# Patient Record
Sex: MALE | Employment: FULL TIME | URBAN - METROPOLITAN AREA
[De-identification: names, ages, dates, MRNs, and addresses within clinical notes are randomized per-mention and may not be internally consistent; named-entity substitution may affect disease eponyms.]

---

## 2020-11-30 ENCOUNTER — APPOINTMENT (OUTPATIENT)
Dept: GENERAL RADIOLOGY | Age: 30
End: 2020-11-30

## 2020-11-30 ENCOUNTER — HOSPITAL ENCOUNTER (EMERGENCY)
Age: 30
Discharge: HOME | End: 2020-11-30
Attending: EMERGENCY MEDICINE

## 2020-11-30 VITALS
WEIGHT: 224 LBS | HEIGHT: 74 IN | TEMPERATURE: 97.1 F | HEART RATE: 67 BPM | DIASTOLIC BLOOD PRESSURE: 93 MMHG | RESPIRATION RATE: 18 BRPM | BODY MASS INDEX: 28.75 KG/M2 | SYSTOLIC BLOOD PRESSURE: 150 MMHG | OXYGEN SATURATION: 99 %

## 2020-11-30 PROCEDURE — 73030 X-RAY EXAM OF SHOULDER: CPT

## 2020-11-30 PROCEDURE — 99283 EMERGENCY DEPT VISIT LOW MDM: CPT

## 2020-11-30 ASSESSMENT — PAIN DESCRIPTION - ONSET: ONSET: ON-GOING

## 2020-11-30 ASSESSMENT — PAIN DESCRIPTION - ORIENTATION: ORIENTATION: LEFT

## 2020-11-30 ASSESSMENT — PAIN SCALES - GENERAL: PAINLEVEL_OUTOF10: 4

## 2020-11-30 ASSESSMENT — PAIN DESCRIPTION - PAIN TYPE: TYPE: ACUTE PAIN

## 2020-11-30 ASSESSMENT — PAIN DESCRIPTION - FREQUENCY: FREQUENCY: CONTINUOUS

## 2020-11-30 ASSESSMENT — PAIN DESCRIPTION - DESCRIPTORS: DESCRIPTORS: ACHING

## 2020-11-30 ASSESSMENT — PAIN - FUNCTIONAL ASSESSMENT: PAIN_FUNCTIONAL_ASSESSMENT: PREVENTS OR INTERFERES SOME ACTIVE ACTIVITIES AND ADLS

## 2020-11-30 ASSESSMENT — PAIN DESCRIPTION - PROGRESSION: CLINICAL_PROGRESSION: NOT CHANGED

## 2020-11-30 ASSESSMENT — PAIN DESCRIPTION - LOCATION: LOCATION: SHOULDER

## 2020-12-01 NOTE — ED NOTES
D/C: Order noted for d/c. Pt confirmed d/c paperwork. Discharge and education instructions reviewed with patient. Teach-back successful. Pt verbalized understanding and signed d/c papers. Pt denied questions at this time. No acute distress noted. Patient instructed to follow-up as noted - return to emergency department if symptoms worsen. Patient verbalized understanding. Discharged per EDMD with discharge instructions. Pt discharged to private vehicle. Patient stable upon departure. Thanked patient for choosing [facility] for care. Provider aware of patient pain at time of discharge.      Pamela Martin RN  11/30/20 0793

## 2021-03-19 ENCOUNTER — APPOINTMENT (OUTPATIENT)
Dept: GENERAL RADIOLOGY | Age: 31
End: 2021-03-19

## 2021-03-19 ENCOUNTER — HOSPITAL ENCOUNTER (EMERGENCY)
Age: 31
Discharge: HOME | End: 2021-03-19

## 2021-03-19 VITALS
RESPIRATION RATE: 18 BRPM | SYSTOLIC BLOOD PRESSURE: 149 MMHG | OXYGEN SATURATION: 97 % | HEIGHT: 74 IN | WEIGHT: 210 LBS | TEMPERATURE: 96.6 F | HEART RATE: 71 BPM | BODY MASS INDEX: 26.95 KG/M2 | DIASTOLIC BLOOD PRESSURE: 79 MMHG

## 2021-03-19 DIAGNOSIS — S83.91XA SPRAIN OF RIGHT KNEE, UNSPECIFIED LIGAMENT, INITIAL ENCOUNTER: Primary | ICD-10-CM

## 2021-03-19 PROCEDURE — 73560 X-RAY EXAM OF KNEE 1 OR 2: CPT

## 2021-03-19 PROCEDURE — 99283 EMERGENCY DEPT VISIT LOW MDM: CPT

## 2021-03-19 RX ORDER — ACETAMINOPHEN 500 MG
1000 TABLET ORAL EVERY 6 HOURS PRN
Qty: 40 TABLET | Refills: 0 | Status: SHIPPED | OUTPATIENT
Start: 2021-03-19 | End: 2021-12-18

## 2021-03-19 ASSESSMENT — ENCOUNTER SYMPTOMS
EYE PAIN: 0
VOMITING: 0
SHORTNESS OF BREATH: 0
SORE THROAT: 0
COUGH: 0
NAUSEA: 0
ABDOMINAL PAIN: 0
BACK PAIN: 0

## 2021-03-19 NOTE — ED NOTES
D/C: Order noted for d/c. Pt confirmed d/c paperwork does have correct name. Discharge and education instructions reviewed with patient. Teach-back successful. Pt verbalized understanding and signed d/c papers. Pt denied questions at this time. No acute distress noted. Patient instructed to follow-up as noted - return to emergency department if symptoms worsen. Patient verbalized understanding. Discharged per EDMD with discharge instructions. Pt discharged to private vehicle. Patient stable upon departure. Thanked patient for choosing [facility] for care. Provider aware of patient pain at time of discharge.      Santino Huff RN  03/19/21 [id number]

## 2021-03-20 NOTE — ED PROVIDER NOTES
[address] [facility]        Pt Name: Jayson Schwartz  MRN: [id number]  [place] 1990  Date of evaluation: 3/19/2021  Provider: YOMAIRA Romero  PCP: No primary care provider on file. MURALI. I have evaluated this patient. My supervising physician was available for consultation. CHIEF COMPLAINT       Chief Complaint   Patient presents with    Knee Injury     patient states that he hurt his right knee while playing basketball on Tuesday. pt. states that when he landed, his foot was planted but his body was turning. HISTORY OF PRESENT ILLNESS   (Location, Timing/Onset, Context/Setting, Quality, Duration, Modifying Factors, Severity, Associated Signs and Symptoms)  Note limiting factors. Jayson Schwartz is a 26 y.o. male presents the emergency department for right knee pain. Patient was playing basketball on Tuesday, had abnormal twisting of his right knee. Has had pain first of his lateral knee and now is anterior knee since. Mild knee swelling. Denies inability to ambulate, calf swelling or pain, red or hot joint, fever, numbness, weakness, chest pain, shortness of breath. Throbbing pain. Pain is nonradiating. Improved with rest.    Nursing Notes were all reviewed and agreed with or any disagreements were addressed in the HPI. REVIEW OF SYSTEMS    (2-9 systems for level 4, 10 or more for level 5)     Review of Systems   Constitutional: Negative for fever. HENT: Negative for sore throat. Eyes: Negative for pain and visual disturbance. Respiratory: Negative for cough and shortness of breath. Cardiovascular: Negative for chest pain. Gastrointestinal: Negative for abdominal pain, nausea and vomiting. Genitourinary: Negative for dysuria and frequency. Musculoskeletal: Positive for arthralgias. Negative for back pain and neck pain. Skin: Negative for rash.    Neurological: Negative for weakness, numbness and headaches. Psychiatric/Behavioral: Negative for confusion. Positives and Pertinent negatives as per HPI. Except as noted above in the ROS, all other systems were reviewed and negative. PAST MEDICAL HISTORY   History reviewed. No pertinent past medical history. SURGICAL HISTORY   History reviewed. No pertinent surgical history. [address]       Discharge Medication List as of 3/19/2021  2:07 PM            ALLERGIES     Patient has no known allergies. FAMILYHISTORY     History reviewed. No pertinent family history. SOCIAL HISTORY       Social History     Tobacco Use    Smoking status: Never Smoker    Smokeless tobacco: Never Used   Substance Use Topics    Alcohol use: No    Drug use: No       SCREENINGS             PHYSICAL EXAM    (up to 7 for level 4, 8 or more for level 5)     ED Triage Vitals [03/19/21 1327]   BP Temp Temp Source Pulse Resp SpO2 Height Weight   (!) 149/79 96.6 °F (35.9 °C) Oral 71 18 97 % 6' 2\" (1.88 m) 210 lb (95.3 kg)       Physical Exam  Vitals signs reviewed. Constitutional:       Appearance: He is not diaphoretic. HENT:      Nose: No congestion or rhinorrhea. Eyes:      General: No scleral icterus. Conjunctiva/sclera: Conjunctivae normal.   Neck:      Musculoskeletal: Normal range of motion and neck supple. Cardiovascular:      Pulses: Normal pulses. Pulmonary:      Effort: Pulmonary effort is normal. No respiratory distress. Breath sounds: No stridor. Musculoskeletal: Normal range of motion. General: Swelling present. No deformity. Comments: Mild right knee swelling. Some laxity but not true positive anterior drawer test.  Negative posterior drawer, Tru's, varus or valgus laxity. No calf swelling or tenderness. No red or hot joint. 2+ DP and PT pulses. Distal motor and sensory intact. Skin:     General: Skin is warm and dry.       Capillary Refill: Capillary refill takes less to display        68-year-old male presents emergency room for right knee pain. Exam and history without concern for septic joint. No calf swelling or tenderness concerning for DVT. X-ray without evidence of fracture or dislocation. Possible ACL injury due to mild laxity with anterior drawer testing. Neurovascular intact extremity. Appropriate for discharge with outpatient follow-up. Given information for on-call orthopedic physician Dr. Miquel Humphrey, recommended follow-up ideally to be seen within 1 week. Given prescription for Tylenol, Voltaren gel. Instructed to return to the emergency room for new or worsening symptoms including but not limited to red or hot joint, fever, calf swelling or pain, chest pain, shortness of breath, numbness, weakness, any other symptoms he is concerned about. Verbal and written discharge instructions and return precautions given. FINAL IMPRESSION      1. Sprain of right knee, unspecified ligament, initial encounter          DISPOSITION/PLAN   DISPOSITION Decision To Discharge 03/19/2021 01:59:59 PM      PATIENT REFERREDTO:  Franco Ayers MD  [address] [facility] [address] [facility]  [phone]    Call in 1 day  Call to schedule orthopedic follow-up, ideally to be seen within 1 week.       DISCHARGE MEDICATIONS:  Discharge Medication List as of 3/19/2021  2:07 PM      START taking these medications    Details   acetaminophen (TYLENOL) 500 MG tablet Take 2 tablets by mouth every 6 hours as needed for Pain, Disp-40 tablet, R-0Normal      diclofenac sodium (VOLTAREN) 1 % GEL Apply 2 g topically 4 times daily as needed for Pain, Topical, 4 TIMES DAILY PRN Starting Fri 3/19/2021, Until Mon 3/29/2021, For 10 days, Disp-50 g, R-0, Normal             DISCONTINUED MEDICATIONS:  Discharge Medication List as of 3/19/2021  2:07 PM                 (Please note that portions of this note were completed with a voice recognition program.  Efforts were made to edit the dictations but

## 2021-03-25 ENCOUNTER — OFFICE VISIT (OUTPATIENT)
Dept: ORTHOPEDIC SURGERY | Age: 31
End: 2021-03-25

## 2021-03-25 VITALS
TEMPERATURE: 97.3 F | HEIGHT: 74 IN | BODY MASS INDEX: 26.95 KG/M2 | DIASTOLIC BLOOD PRESSURE: 77 MMHG | SYSTOLIC BLOOD PRESSURE: 134 MMHG | WEIGHT: 210 LBS

## 2021-03-25 DIAGNOSIS — M76.899 QUADRICEPS TENDONITIS: ICD-10-CM

## 2021-03-25 DIAGNOSIS — M25.561 ACUTE PAIN OF RIGHT KNEE: Primary | ICD-10-CM

## 2021-03-25 PROCEDURE — 99203 OFFICE O/P NEW LOW 30 MIN: CPT | Performed by: ORTHOPAEDIC SURGERY

## 2021-03-25 RX ORDER — NAPROXEN 500 MG/1
500 TABLET ORAL 2 TIMES DAILY WITH MEALS
Qty: 60 TABLET | Refills: 0 | OUTPATIENT
Start: 2021-03-25 | End: 2021-12-18

## 2021-03-25 NOTE — PROGRESS NOTES
groups. No knee effusion. Imaging:   AP weightbearing and sunrise views of the right knee were performed and interpreted today. Is maintained joint spaces and no fractures or dislocations. Assessment:  Right knee quadriceps tendinitis    Plan:  We discussed the diagnosis and treatment options. He seems to have a musculotendinous strain of his vastus medialis and quadriceps tendon. I recommended naproxen 500 mg twice daily and this was prescribed. He will continue using the Voltaren cream and his knee sleeve as well. I recommended mild activity restriction over the next 2 weeks and then a home directed physical therapy exercise program.  He has a  so he will do this on his own. I recommended quad strengthening exercises. He will follow up in 4 weeks for assessment of symptoms. Total time spent on today's encounter was at least 32 minutes. This time included reviewing prior notes, radiographs, and lab results when available, reviewing history obtained by medical assistant, performing history and physical exam, reviewing tests/radiographs with the patient, counseling the patient, ordering medications or tests, documentation in the electronic health record, and coordination of care. This dictation was done with Dragon dictation and may contain mechanical errors related to translation.

## 2021-12-18 ENCOUNTER — HOSPITAL ENCOUNTER (EMERGENCY)
Age: 31
Discharge: HOME | End: 2021-12-18
Payer: OTHER MISCELLANEOUS

## 2021-12-18 ENCOUNTER — APPOINTMENT (OUTPATIENT)
Dept: GENERAL RADIOLOGY | Age: 31
End: 2021-12-18
Payer: OTHER MISCELLANEOUS

## 2021-12-18 VITALS
BODY MASS INDEX: 28.89 KG/M2 | RESPIRATION RATE: 14 BRPM | WEIGHT: 225 LBS | TEMPERATURE: 97.4 F | OXYGEN SATURATION: 96 % | SYSTOLIC BLOOD PRESSURE: 115 MMHG | HEART RATE: 60 BPM | DIASTOLIC BLOOD PRESSURE: 65 MMHG

## 2021-12-18 DIAGNOSIS — S39.012A LUMBAR STRAIN, INITIAL ENCOUNTER: Primary | ICD-10-CM
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
## 2021-12-18 PROCEDURE — 72100 X-RAY EXAM L-S SPINE 2/3 VWS: CPT

## 2021-12-18 PROCEDURE — 99284 EMERGENCY DEPT VISIT MOD MDM: CPT

## 2021-12-18 RX ORDER — LIDOCAINE 4 G/G
1 PATCH TOPICAL DAILY
Qty: 30 PATCH | Refills: 0 | Status: SHIPPED | OUTPATIENT
Start: 2021-12-18 | End: 2022-01-17

## 2021-12-18 RX ORDER — METHOCARBAMOL 750 MG/1
750 TABLET, FILM COATED ORAL EVERY 8 HOURS PRN
Qty: 24 TABLET | Refills: 0 | Status: SHIPPED | OUTPATIENT
Start: 2021-12-18 | End: 2021-12-25

## 2021-12-18 RX ORDER — IBUPROFEN 800 MG/1
800 TABLET ORAL EVERY 8 HOURS PRN
Qty: 30 TABLET | Refills: 0 | Status: SHIPPED | OUTPATIENT
Start: 2021-12-18

## 2021-12-18 RX ORDER — ACETAMINOPHEN 500 MG
500 TABLET ORAL EVERY 6 HOURS PRN
Qty: 28 TABLET | Refills: 0 | Status: SHIPPED | OUTPATIENT
Start: 2021-12-18 | End: 2021-12-25

## 2021-12-18 ASSESSMENT — PAIN DESCRIPTION - LOCATION
LOCATION: BACK
LOCATION: BACK

## 2021-12-18 ASSESSMENT — PAIN DESCRIPTION - FREQUENCY: FREQUENCY: CONTINUOUS

## 2021-12-18 ASSESSMENT — PAIN SCALES - GENERAL
PAINLEVEL_OUTOF10: 7

## 2021-12-18 ASSESSMENT — PAIN DESCRIPTION - ONSET: ONSET: ON-GOING

## 2021-12-18 ASSESSMENT — PAIN DESCRIPTION - ORIENTATION
ORIENTATION: LOWER
ORIENTATION: LOWER

## 2021-12-18 ASSESSMENT — PAIN DESCRIPTION - PAIN TYPE
TYPE: ACUTE PAIN
TYPE: ACUTE PAIN

## 2021-12-18 NOTE — Clinical Note
Deanne Fabry was seen and treated in our emergency department on 12/18/2021. He may return to work on 12/20/2021. No lifting/pushing/pulling anything greater than 5-10 lbs for 5 days. If you have any questions or concerns, please don't hesitate to call.       Sagrario Queen
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
## 2021-12-29 ENCOUNTER — HOSPITAL ENCOUNTER (EMERGENCY)
Age: 31
Discharge: LEFT BEFORE DISCHARGE | End: 2021-12-29
Attending: STUDENT IN AN ORGANIZED HEALTH CARE EDUCATION/TRAINING PROGRAM

## 2021-12-29 VITALS
DIASTOLIC BLOOD PRESSURE: 75 MMHG | RESPIRATION RATE: 18 BRPM | HEART RATE: 77 BPM | OXYGEN SATURATION: 97 % | SYSTOLIC BLOOD PRESSURE: 144 MMHG | WEIGHT: 227.07 LBS | HEIGHT: 74 IN | TEMPERATURE: 97.8 F | BODY MASS INDEX: 29.14 KG/M2

## 2021-12-29 ASSESSMENT — PAIN DESCRIPTION - FREQUENCY: FREQUENCY: CONTINUOUS

## 2021-12-29 ASSESSMENT — PAIN DESCRIPTION - LOCATION: LOCATION: HEAD

## 2021-12-29 ASSESSMENT — PAIN SCALES - GENERAL: PAINLEVEL_OUTOF10: 8

## 2021-12-29 ASSESSMENT — PAIN DESCRIPTION - PAIN TYPE: TYPE: ACUTE PAIN

## 2021-12-29 ASSESSMENT — PAIN DESCRIPTION - DESCRIPTORS: DESCRIPTORS: OTHER (COMMENT)

## 2023-03-16 ENCOUNTER — HOSPITAL ENCOUNTER (INPATIENT)
Age: 33
LOS: 2 days | Discharge: HOME | DRG: 639 | End: 2023-03-18
Attending: EMERGENCY MEDICINE | Admitting: INTERNAL MEDICINE

## 2023-03-16 DIAGNOSIS — E11.9 NEW ONSET TYPE 2 DIABETES MELLITUS (HCC): ICD-10-CM

## 2023-03-16 DIAGNOSIS — E55.9 HYPOVITAMINOSIS D: ICD-10-CM

## 2023-03-16 DIAGNOSIS — E11.9 DIABETES MELLITUS, NEW ONSET (HCC): Primary | ICD-10-CM

## 2023-03-16 DIAGNOSIS — Z71.89 GOALS OF CARE, COUNSELING/DISCUSSION: ICD-10-CM

## 2023-03-16 DIAGNOSIS — R73.9 HYPERGLYCEMIA: ICD-10-CM

## 2023-03-16 LAB
25(OH)D3 SERPL-MCNC: 10.4 NG/ML
ALBUMIN SERPL-MCNC: 4.1 G/DL (ref 3.4–5)
ALBUMIN/GLOB SERPL: 1.1 {RATIO} (ref 1.1–2.2)
ALP SERPL-CCNC: 182 U/L (ref 40–129)
ALT SERPL-CCNC: 31 U/L (ref 10–40)
ANION GAP SERPL CALCULATED.3IONS-SCNC: 10 MMOL/L (ref 3–16)
AST SERPL-CCNC: 22 U/L (ref 15–37)
BASE EXCESS BLDV CALC-SCNC: 1.6 MMOL/L
BASOPHILS # BLD: 0.1 K/UL (ref 0–0.2)
BASOPHILS NFR BLD: 1 %
BETA-HYDROXYBUTYRATE: 0.11 MMOL/L (ref 0–0.27)
BILIRUB SERPL-MCNC: 0.4 MG/DL (ref 0–1)
BILIRUB UR QL STRIP.AUTO: NEGATIVE
BUN SERPL-MCNC: 15 MG/DL (ref 7–20)
CALCIUM SERPL-MCNC: 9.5 MG/DL (ref 8.3–10.6)
CHLORIDE SERPL-SCNC: 93 MMOL/L (ref 99–110)
CHOLEST SERPL-MCNC: 197 MG/DL (ref 0–199)
CLARITY UR: CLEAR
CO2 BLDV-SCNC: 29 MMOL/L
CO2 SERPL-SCNC: 24 MMOL/L (ref 21–32)
COHGB MFR BLDV: 1.4 %
COLOR UR: YELLOW
CREAT SERPL-MCNC: 1.3 MG/DL (ref 0.9–1.3)
DEPRECATED RDW RBC AUTO: 12.9 % (ref 12.4–15.4)
EOSINOPHIL # BLD: 0.1 K/UL (ref 0–0.6)
EOSINOPHIL NFR BLD: 1.2 %
EST. AVERAGE GLUCOSE BLD GHB EST-MCNC: 294.8 MG/DL
EST. AVERAGE GLUCOSE BLD GHB EST-MCNC: 303.4 MG/DL
GFR SERPLBLD CREATININE-BSD FMLA CKD-EPI: >60 ML/MIN/{1.73_M2}
GLUCOSE BLD-MCNC: 243 MG/DL (ref 70–99)
GLUCOSE BLD-MCNC: 308 MG/DL (ref 70–99)
GLUCOSE BLD-MCNC: 368 MG/DL (ref 70–99)
GLUCOSE BLD-MCNC: 598 MG/DL (ref 70–99)
GLUCOSE SERPL-MCNC: 672 MG/DL (ref 70–99)
GLUCOSE UR STRIP.AUTO-MCNC: >=1000 MG/DL
HBA1C MFR BLD: 11.9 %
HBA1C MFR BLD: 12.2 %
HCO3 BLDV-SCNC: 27 MMOL/L (ref 23–29)
HCT VFR BLD AUTO: 45.8 % (ref 40.5–52.5)
HDLC SERPL-MCNC: 39 MG/DL (ref 40–60)
HGB BLD-MCNC: 15 G/DL (ref 13.5–17.5)
HGB UR QL STRIP.AUTO: NEGATIVE
KETONES UR STRIP.AUTO-MCNC: NEGATIVE MG/DL
LDLC SERPL CALC-MCNC: 120 MG/DL
LEUKOCYTE ESTERASE UR QL STRIP.AUTO: NEGATIVE
LIPASE SERPL-CCNC: 58 U/L (ref 13–60)
LYMPHOCYTES # BLD: 1.3 K/UL (ref 1–5.1)
LYMPHOCYTES NFR BLD: 24 %
MCH RBC QN AUTO: 28.7 PG (ref 26–34)
MCHC RBC AUTO-ENTMCNC: 32.8 G/DL (ref 31–36)
MCV RBC AUTO: 87.5 FL (ref 80–100)
METHGB MFR BLDV: 0.7 %
MONOCYTES # BLD: 0.6 K/UL (ref 0–1.3)
MONOCYTES NFR BLD: 10.6 %
NEUTROPHILS # BLD: 3.4 K/UL (ref 1.7–7.7)
NEUTROPHILS NFR BLD: 63.2 %
NITRITE UR QL STRIP.AUTO: NEGATIVE
O2 THERAPY: NORMAL
PCO2 BLDV: 46.2 MMHG (ref 40–50)
PERFORMED ON: ABNORMAL
PH BLDV: 7.38 [PH] (ref 7.35–7.45)
PH UR STRIP.AUTO: 6 [PH] (ref 5–8)
PLATELET # BLD AUTO: 54 K/UL (ref 135–450)
PMV BLD AUTO: 10.6 FL (ref 5–10.5)
PO2 BLDV: 58 MMHG
POTASSIUM SERPL-SCNC: 4.6 MMOL/L (ref 3.5–5.1)
PROT SERPL-MCNC: 7.7 G/DL (ref 6.4–8.2)
PROT UR STRIP.AUTO-MCNC: NEGATIVE MG/DL
RBC # BLD AUTO: 5.23 M/UL (ref 4.2–5.9)
RBC MORPH BLD: NORMAL
SAO2 % BLDV: 91 %
SODIUM SERPL-SCNC: 127 MMOL/L (ref 136–145)
SP GR UR STRIP.AUTO: 1.03 (ref 1–1.03)
TRIGL SERPL-MCNC: 192 MG/DL (ref 0–150)
UA COMPLETE W REFLEX CULTURE PNL UR: ABNORMAL
UA DIPSTICK W REFLEX MICRO PNL UR: ABNORMAL
URN SPEC COLLECT METH UR: ABNORMAL
UROBILINOGEN UR STRIP-ACNC: 0.2 E.U./DL
VLDLC SERPL CALC-MCNC: 38 MG/DL
WBC # BLD AUTO: 5.4 K/UL (ref 4–11)

## 2023-03-16 PROCEDURE — 36415 COLL VENOUS BLD VENIPUNCTURE: CPT

## 2023-03-16 PROCEDURE — 2580000003 HC RX 258

## 2023-03-16 PROCEDURE — 94760 N-INVAS EAR/PLS OXIMETRY 1: CPT

## 2023-03-16 PROCEDURE — 6370000000 HC RX 637 (ALT 250 FOR IP): Performed by: INTERNAL MEDICINE

## 2023-03-16 PROCEDURE — 81003 URINALYSIS AUTO W/O SCOPE: CPT

## 2023-03-16 PROCEDURE — 85025 COMPLETE CBC W/AUTO DIFF WBC: CPT

## 2023-03-16 PROCEDURE — 82010 KETONE BODYS QUAN: CPT

## 2023-03-16 PROCEDURE — 1200000000 HC SEMI PRIVATE

## 2023-03-16 PROCEDURE — 82306 VITAMIN D 25 HYDROXY: CPT

## 2023-03-16 PROCEDURE — 83690 ASSAY OF LIPASE: CPT

## 2023-03-16 PROCEDURE — 83036 HEMOGLOBIN GLYCOSYLATED A1C: CPT

## 2023-03-16 PROCEDURE — 82803 BLOOD GASES ANY COMBINATION: CPT

## 2023-03-16 PROCEDURE — 2580000003 HC RX 258: Performed by: INTERNAL MEDICINE

## 2023-03-16 PROCEDURE — 80061 LIPID PANEL: CPT

## 2023-03-16 PROCEDURE — 80053 COMPREHEN METABOLIC PANEL: CPT

## 2023-03-16 PROCEDURE — 99285 EMERGENCY DEPT VISIT HI MDM: CPT

## 2023-03-16 RX ORDER — ACETAMINOPHEN 325 MG/1
650 TABLET ORAL EVERY 6 HOURS PRN
Status: DISCONTINUED | OUTPATIENT
Start: 2023-03-16 | End: 2023-03-18 | Stop reason: HOSPADM

## 2023-03-16 RX ORDER — SODIUM CHLORIDE 0.9 % (FLUSH) 0.9 %
10 SYRINGE (ML) INJECTION PRN
Status: DISCONTINUED | OUTPATIENT
Start: 2023-03-16 | End: 2023-03-18 | Stop reason: HOSPADM

## 2023-03-16 RX ORDER — 0.9 % SODIUM CHLORIDE 0.9 %
2000 INTRAVENOUS SOLUTION INTRAVENOUS ONCE
Status: COMPLETED | OUTPATIENT
Start: 2023-03-16 | End: 2023-03-16

## 2023-03-16 RX ORDER — DEXTROSE MONOHYDRATE 100 MG/ML
INJECTION, SOLUTION INTRAVENOUS CONTINUOUS PRN
Status: DISCONTINUED | OUTPATIENT
Start: 2023-03-16 | End: 2023-03-18 | Stop reason: HOSPADM

## 2023-03-16 RX ORDER — ONDANSETRON 2 MG/ML
4 INJECTION INTRAMUSCULAR; INTRAVENOUS EVERY 6 HOURS PRN
Status: DISCONTINUED | OUTPATIENT
Start: 2023-03-16 | End: 2023-03-18 | Stop reason: HOSPADM

## 2023-03-16 RX ORDER — INSULIN LISPRO 100 [IU]/ML
0-4 INJECTION, SOLUTION INTRAVENOUS; SUBCUTANEOUS NIGHTLY
Status: DISCONTINUED | OUTPATIENT
Start: 2023-03-16 | End: 2023-03-18 | Stop reason: HOSPADM

## 2023-03-16 RX ORDER — ACETAMINOPHEN 650 MG/1
650 SUPPOSITORY RECTAL EVERY 6 HOURS PRN
Status: DISCONTINUED | OUTPATIENT
Start: 2023-03-16 | End: 2023-03-18 | Stop reason: HOSPADM

## 2023-03-16 RX ORDER — ONDANSETRON 2 MG/ML
4 INJECTION INTRAMUSCULAR; INTRAVENOUS EVERY 30 MIN PRN
Status: DISCONTINUED | OUTPATIENT
Start: 2023-03-16 | End: 2023-03-16 | Stop reason: SDUPTHER

## 2023-03-16 RX ORDER — VITAMIN B COMPLEX
2000 TABLET ORAL DAILY
Status: DISCONTINUED | OUTPATIENT
Start: 2023-03-16 | End: 2023-03-17

## 2023-03-16 RX ORDER — SODIUM CHLORIDE 0.9 % (FLUSH) 0.9 %
5-40 SYRINGE (ML) INJECTION EVERY 12 HOURS SCHEDULED
Status: DISCONTINUED | OUTPATIENT
Start: 2023-03-16 | End: 2023-03-18 | Stop reason: HOSPADM

## 2023-03-16 RX ORDER — SODIUM CHLORIDE 9 MG/ML
INJECTION, SOLUTION INTRAVENOUS PRN
Status: DISCONTINUED | OUTPATIENT
Start: 2023-03-16 | End: 2023-03-18 | Stop reason: HOSPADM

## 2023-03-16 RX ORDER — SODIUM CHLORIDE 9 MG/ML
INJECTION, SOLUTION INTRAVENOUS CONTINUOUS
Status: ACTIVE | OUTPATIENT
Start: 2023-03-16 | End: 2023-03-18

## 2023-03-16 RX ORDER — POLYETHYLENE GLYCOL 3350 17 G/17G
17 POWDER, FOR SOLUTION ORAL DAILY PRN
Status: DISCONTINUED | OUTPATIENT
Start: 2023-03-16 | End: 2023-03-18 | Stop reason: HOSPADM

## 2023-03-16 RX ORDER — ENOXAPARIN SODIUM 100 MG/ML
30 INJECTION SUBCUTANEOUS 2 TIMES DAILY
Status: DISCONTINUED | OUTPATIENT
Start: 2023-03-16 | End: 2023-03-16

## 2023-03-16 RX ORDER — ONDANSETRON 4 MG/1
4 TABLET, ORALLY DISINTEGRATING ORAL EVERY 8 HOURS PRN
Status: DISCONTINUED | OUTPATIENT
Start: 2023-03-16 | End: 2023-03-18 | Stop reason: HOSPADM

## 2023-03-16 RX ORDER — LANCETS 30 GAUGE
1 EACH MISCELLANEOUS 4 TIMES DAILY
Qty: 200 EACH | Refills: 0 | Status: SHIPPED | OUTPATIENT
Start: 2023-03-16

## 2023-03-16 RX ORDER — CALCIUM CITRATE/VITAMIN D3 200MG-6.25
1 TABLET ORAL
Qty: 200 EACH | Refills: 3 | Status: SHIPPED | OUTPATIENT
Start: 2023-03-16

## 2023-03-16 RX ORDER — ATORVASTATIN CALCIUM 10 MG/1
10 TABLET, FILM COATED ORAL NIGHTLY
Status: DISCONTINUED | OUTPATIENT
Start: 2023-03-16 | End: 2023-03-18 | Stop reason: HOSPADM

## 2023-03-16 RX ORDER — INSULIN GLARGINE 100 [IU]/ML
0.15 INJECTION, SOLUTION SUBCUTANEOUS DAILY
Status: DISCONTINUED | OUTPATIENT
Start: 2023-03-16 | End: 2023-03-17

## 2023-03-16 RX ORDER — INSULIN LISPRO 100 [IU]/ML
0-8 INJECTION, SOLUTION INTRAVENOUS; SUBCUTANEOUS
Status: DISCONTINUED | OUTPATIENT
Start: 2023-03-16 | End: 2023-03-18 | Stop reason: HOSPADM

## 2023-03-16 RX ADMIN — Medication 2000 UNITS: at 16:23

## 2023-03-16 RX ADMIN — SODIUM CHLORIDE: 9 INJECTION, SOLUTION INTRAVENOUS at 12:48

## 2023-03-16 RX ADMIN — INSULIN LISPRO 8 UNITS: 100 INJECTION, SOLUTION INTRAVENOUS; SUBCUTANEOUS at 12:22

## 2023-03-16 RX ADMIN — METFORMIN HYDROCHLORIDE 500 MG: 500 TABLET ORAL at 16:47

## 2023-03-16 RX ADMIN — SODIUM CHLORIDE: 9 INJECTION, SOLUTION INTRAVENOUS at 22:01

## 2023-03-16 RX ADMIN — INSULIN LISPRO 4 UNITS: 100 INJECTION, SOLUTION INTRAVENOUS; SUBCUTANEOUS at 22:02

## 2023-03-16 RX ADMIN — INSULIN LISPRO 2 UNITS: 100 INJECTION, SOLUTION INTRAVENOUS; SUBCUTANEOUS at 16:53

## 2023-03-16 RX ADMIN — Medication 10 ML: at 12:43

## 2023-03-16 RX ADMIN — INSULIN GLARGINE 15 UNITS: 100 INJECTION, SOLUTION SUBCUTANEOUS at 12:56

## 2023-03-16 RX ADMIN — ATORVASTATIN CALCIUM 10 MG: 10 TABLET, FILM COATED ORAL at 22:02

## 2023-03-16 RX ADMIN — SODIUM CHLORIDE 2000 ML: 9 INJECTION, SOLUTION INTRAVENOUS at 07:39

## 2023-03-16 ASSESSMENT — ENCOUNTER SYMPTOMS
SORE THROAT: 0
RHINORRHEA: 0
SHORTNESS OF BREATH: 0
ABDOMINAL PAIN: 0
NAUSEA: 0
COUGH: 0
VOMITING: 0
DIARRHEA: 0
EYE PAIN: 0
BACK PAIN: 0
CONSTIPATION: 0

## 2023-03-16 ASSESSMENT — PAIN SCALES - GENERAL
PAINLEVEL_OUTOF10: 0
PAINLEVEL_OUTOF10: 0

## 2023-03-16 ASSESSMENT — LIFESTYLE VARIABLES
HOW OFTEN DO YOU HAVE A DRINK CONTAINING ALCOHOL: NEVER
HOW MANY STANDARD DRINKS CONTAINING ALCOHOL DO YOU HAVE ON A TYPICAL DAY: PATIENT DOES NOT DRINK

## 2023-03-16 NOTE — ED PROVIDER NOTES
[address]        Pt Name: Blair Shannon  MRN: [id number]  [place] 1990  Date of evaluation: 3/16/2023  Provider: COURT Mejia CNP  PCP: No primary care provider on file. Note Started: 7:01 AM EDT 3/16/23       I have seen and evaluated this patient with my supervising physician Michael Eisenberg MD.      [address] [place] [address]       Chief Complaint   Patient presents with    Hyperglycemia     Patient came to ed complaint of high blood sugar. Patient states he is not diabetic and does not take anything for it. Patient states he does not know what level is was because his girl friend took it. Patient states it just read high so he came in. Patient states he has been peeing every hour. Patient denies pain or nausea  at this time. Patient is ambulatory and a/o x4 at this time        HISTORY OF PRESENT ILLNESS: 1 or more Elements     History From: pt            Chief Complaint:dora Shannon is a 27 y.o. male who presents to the ED with c/o high BS. He is not diabetic. Has a positive family history on both sides with diabetes. Did eat 6 pieces of jerk chicken before sugars were checked  + polyuria and polydipsia x 2 weeks  No abd pain, n/v/d      Nursing Notes were all reviewed and agreed with or any disagreements were addressed in the HPI. REVIEW OF SYSTEMS :      Review of Systems   Constitutional:  Negative for chills, diaphoresis and fever. HENT:  Negative for congestion, rhinorrhea and sore throat. Eyes:  Negative for pain and visual disturbance. Respiratory:  Negative for cough and shortness of breath. Cardiovascular:  Negative for chest pain and leg swelling. Gastrointestinal:  Negative for abdominal pain, constipation, diarrhea, nausea and vomiting. Endocrine: Positive for polydipsia and polyuria. Genitourinary:  Positive for frequency. Negative for hematuria.    Musculoskeletal: Negative for back pain and neck pain. Skin:  Negative for rash and wound. Neurological:  Negative for dizziness and light-headedness. Positives and Pertinent negatives as per HPI. SURGICAL HISTORY   No past surgical history on file. CURRENTMEDICATIONS       Previous Medications    ACETAMINOPHEN (TYLENOL) 500 MG TABLET    Take 1 tablet by mouth every 6 hours as needed for Pain    IBUPROFEN (ADVIL;MOTRIN) 800 MG TABLET    Take 1 tablet by mouth every 8 hours as needed for Pain       ALLERGIES     Patient has no known allergies. FAMILYHISTORY     No family history on file. SOCIAL HISTORY       Social History     Tobacco Use    Smoking status: Never    Smokeless tobacco: Never   Substance Use Topics    Alcohol use: No    Drug use: No       SCREENINGS        [place] Coma Scale  Eye Opening: Spontaneous  Best Verbal Response: Oriented  Best Motor Response: Obeys commands  [place] Coma Scale Score: 15                CIWA Assessment  BP: 136/80  Heart Rate: 67           PHYSICAL EXAM  1 or more Elements     ED Triage Vitals [03/16/23 0645]   BP Temp Temp Source Heart Rate Resp SpO2 Height Weight   (!) 147/85 97.4 °F (36.3 °C) Oral 62 16 97 % 6' 2\" (1.88 m) 223 lb 8.7 oz (101.4 kg)       Physical Exam  Vitals and nursing note reviewed. Constitutional:       General: He is not in acute distress. Appearance: Normal appearance. He is obese. He is not ill-appearing or diaphoretic. HENT:      Head: Normocephalic and atraumatic. Right Ear: External ear normal.      Left Ear: External ear normal.      Nose: Nose normal. No congestion or rhinorrhea. Mouth/Throat:      Mouth: Mucous membranes are moist.      Pharynx: Oropharynx is clear. No oropharyngeal exudate or posterior oropharyngeal erythema. Eyes:      General: No scleral icterus. Right eye: No discharge. Left eye: No discharge. Extraocular Movements: Extraocular movements intact.       Conjunctiva/sclera: Conjunctivae normal.      Pupils: Pupils are equal, round, and reactive to light. Cardiovascular:      Rate and Rhythm: Normal rate and regular rhythm. Pulses: Normal pulses. Heart sounds: Normal heart sounds. No murmur heard. No friction rub. No gallop. Pulmonary:      Effort: Pulmonary effort is normal. No respiratory distress. Breath sounds: Normal breath sounds. No stridor. No wheezing, rhonchi or rales. Abdominal:      General: Abdomen is flat. Bowel sounds are normal. There is no distension. Palpations: Abdomen is soft. Tenderness: There is no abdominal tenderness. There is no right CVA tenderness, left CVA tenderness or guarding. Musculoskeletal:         General: Normal range of motion. Cervical back: Normal range of motion and neck supple. No rigidity. Right lower leg: No edema. Left lower leg: No edema. Lymphadenopathy:      Cervical: No cervical adenopathy. Skin:     General: Skin is warm and dry. Capillary Refill: Capillary refill takes less than 2 seconds. Coloration: Skin is not jaundiced or pale. Findings: No bruising or rash. Neurological:      General: No focal deficit present. Mental Status: He is alert and oriented to person, place, and time. Mental status is at baseline.    Psychiatric:         Mood and Affect: Mood normal.         Behavior: Behavior normal.         DIAGNOSTIC RESULTS   LABS:    Labs Reviewed   COMPREHENSIVE METABOLIC PANEL W/ REFLEX TO MG FOR LOW K - Abnormal; Notable for the following components:       Result Value    Sodium 127 (*)     Chloride 93 (*)     Glucose 672 (*)     Alkaline Phosphatase 182 (*)     All other components within normal limits    Narrative:     Chandler Benson tel. [phone],  Chemistry results called to and read back by Kyleigh Kwong RN, 03/16/2023  08:16, by Javan Denise   URINALYSIS WITH REFLEX TO CULTURE - Abnormal; Notable for the following components:    Glucose, Ur >=1000 (*) All other components within normal limits   POCT GLUCOSE - Abnormal; Notable for the following components:    POC Glucose 598 (*)     All other components within normal limits   CBC WITH AUTO DIFFERENTIAL   LIPASE   BETA-HYDROXYBUTYRATE   BLOOD GAS, VENOUS   HEMOGLOBIN A1C   POCT GLUCOSE       When ordered only abnormal lab results are displayed. All other labs were within normal range or not returned as of this dictation. EKG: When ordered, EKG's are interpreted by the Emergency Department Physician in the absence of a cardiologist.  Please see their note for interpretation of EKG. RADIOLOGY:   Non-plain film images such as CT, Ultrasound and MRI are read by the radiologist. Plain radiographic images are visualized and preliminarily interpreted by the ED Provider with the below findings:        Interpretation per the Radiologist below, if available at the time of this note:    No orders to display     No results found. No results found. PROCEDURES   Unless otherwise noted below, none     Procedures    CRITICAL CARE TIME (.cctime)       I provided 15 minutes of non concurrent Critical Care time, excluding separately reportable procedures. There was a high probability of clinically significant/life threatening deterioration in the patient's condition which required my urgent intervention. This time spent assessing, reassessing patient, chart review and discussing case with other providers      PAST MEDICAL HISTORY      has no past medical history on file.      EMERGENCY DEPARTMENT COURSE and DIFFERENTIAL DIAGNOSIS/MDM:   Vitals:    Vitals:    03/16/23 0645 03/16/23 0715 03/16/23 0815   BP: (!) 147/85 (!) 149/95 136/80   Pulse: 62 74 67   Resp: 16 15 18   Temp: 97.4 °F (36.3 °C)     TempSrc: Oral     SpO2: 97% 93% 97%   Weight: 223 lb 8.7 oz (101.4 kg)     Height: 6' 2\" (1.88 m)         Patient was given the following medications:  Medications   ondansetron (ZOFRAN) injection 4 mg (has no administration in time range)   0.9 % sodium chloride bolus (2,000 mLs IntraVENous New Bag 3/16/23 0739)             Is this patient to be included in the SEP-1 Core Measure due to severe sepsis or septic shock? No   Exclusion criteria - the patient is NOT to be included for SEP-1 Core Measure due to:  2+ SIRS criteria are not met        Chronic Conditions affecting care:    has no past medical history on file. CONSULTS: (Who and What was discussed)  None      Records Reviewed (External and Source)     CC/HPI Summary, DDx, ED Course, and Reassessment:     28 yo with polyuria, polydipsia and high BS    No PCP    Work up started to r/o DKA    IV hydration ordered. Labs-    CBC without gross abnormalities. CMP with pseudohyponatremia. Alk phos elevated 182. No gross renal function or liver function abnormalities other. Lipase within normal limits. Urinalysis with significant glycosuria. No evidence of infection. Serum ketones within normal limits. VBG without evidence of acidosis. A1c is pending. Findings and plan were discussed with the patient. Concerning for new onset diabetes. As such the medicine team will be consulted for admission. Patient is agreeable with admission. Goals of care discussed with patient. Prefers full code. Disposition Considerations (tests considered but not done, Admit vs D/C, Shared Decision Making, Pt Expectation of Test or Tx.):     FINAL IMPRESSION      1. Hyperglycemia    2. Diabetes mellitus, new onset ([facility] [address]    3. Goals of care, counseling/discussion          DISPOSITION/PLAN     DISPOSITION Decision To Admit 03/16/2023 09:09:37 AM      PATIENT REFERRED TO:  No follow-up provider specified.     DISCHARGE MEDICATIONS:  New Prescriptions    No medications on file       DISCONTINUED MEDICATIONS:  Discontinued Medications    No medications on file              (Please note that portions of this note were completed with a voice recognition program.  Efforts were made to edit the dictations but occasionally words are mis-transcribed.)    COURT Wen CNP (electronically signed)       COURT Wen CNP  03/16/23 6349

## 2023-03-16 NOTE — PROGRESS NOTES
Admitted to room 3104 from ED . Patient oriented to room mechanics. Patient denies pain or discomfort at present time. Call light in reach. Patient sitting up in chair . Fall precautions in place.  Electronically signed by Avtar Dasilva RN on 3/16/2023 at 5:38 PM

## 2023-03-16 NOTE — H&P
Hospital Medicine History and Physical    3/16/2023    Date of Admission: 3/16/2023    Date of Service: Pt seen/examined on 3/16/2023 and admitted to inpatient. Assessment/plan:  New onset diabetes type 2 presenting with hyperglycemia, glucose 672 in the emergency room. Start intravenous fluid. Start scheduled Lantus, sliding scale insulin and scheduled metformin. Monitor Accu-Chek closely and adjust insulin dose as needed. Depending on hemoglobin A1c level, may need insulin at discharge. Reviewed urinalysis; no evidence of proteinuria. Consult has been initiated to diabetes educator. Counseled patient about importance of checking his feet for wound on a daily basis, educated about yearly eye exam.  We also discussed complications of diabetes and importance of compliance with medications. Hyponatremia/pseudohyponatremia secondary to hyperglycemia. Treat underlying hyperglycemia. Continue intravenous fluid. Recheck sodium level in the morning. Elevated alkaline phosphatase level. Check 25-hydroxy vitamin D level, happens to be elevated. Patient has been started on vitamin D supplement. Hyperlipidemia associated with diabetes. Start low-dose Lipitor 10 mg daily. Disposition. Possible discharge home tomorrow. Activities: Up with assist  Prophylaxis: Subcutaneous Lovenox  Code status: Full    ==========================================================  Chief complaint:  Chief Complaint   Patient presents with    Hyperglycemia     Patient came to ed complaint of high blood sugar. Patient states he is not diabetic and does not take anything for it. Patient states he does not know what level is was because his girl friend took it. Patient states it just read high so he came in. Patient states he has been peeing every hour. Patient denies pain or nausea  at this time. Patient is ambulatory and a/o x4 at this time        History of Presenting Illness:   This is a pleasant 25-year-old male with no significant past medical history, who reports family history of diabetes, who presents to the emergency room with complaints of polyuria, polydipsia, ongoing for about 2 weeks. He does not have polyphagia. No chest pain or shortness of breath. No other urinary symptoms evidence of infectious process. His girlfriend checked his blood sugar, noted to be elevated, at which point he presents to the emergency room for evaluation. He was noted to have blood glucose greater than 600 in the emergency room, and hospital medicine service consulted for admission for further evaluation. Past Medical History:  No past medical history on file. Past Surgical History:  No past surgical history on file. Medications (prior to admission):  Prior to Admission medications    Medication Sig Start Date End Date Taking? Authorizing Provider   naproxen (NAPROSYN) 500 MG tablet Take 1 tablet by mouth 2 times daily (with meals) 3/25/21 12/18/21  Renzo Cobian MD   diclofenac sodium (VOLTAREN) 1 % GEL Apply 2 g topically 4 times daily as needed for Pain 3/19/21 12/18/21  YOMAIRA Martinez       Allergy(ies):  Patient has no known allergies. Social History:  TOBACCO:  reports that he has never smoked. He has never used smokeless tobacco.  ETOH:  reports no history of alcohol use. Family History:  Positive for family history of diabetes. Review of Systems:  Pertinent positives are listed in HPI. At least 10-point ROS reviewed and were negative. Vitals and physical examination:  /80   Pulse 67   Temp 97.4 °F (36.3 °C) (Oral)   Resp 18   Ht 6' 2\" (1.88 m)   Wt 223 lb 8.7 oz (101.4 kg)   SpO2 97%   BMI 28.70 kg/m²   Gen/overall appearance: Not in acute distress. Alert. Oriented x3. Head: Normocephalic, atraumatic  Eyes: EOMI, good acuity  ENT: Oral mucosa moist  Neck: No JVD, thyromegaly  CVS: Nml S1S2, no MRG, RRR  Pulm: Clear bilaterally.  No crackles/wheezes  Gastrointestinal: Soft, NT/ND, +BS  Musculoskeletal: No edema. Warm  Neuro: No focal deficit. Moves extremity spontaneously.  Psychiatry: Appropriate affect. Not agitated.  Skin: Warm, dry with normal turgor. No rash  Capillary refill: Brisk,< 3 seconds   Peripheral Pulses: +2 palpable, equal bilaterally       Labs/imaging/EKG:  CBC:   Recent Labs     03/16/23  0732   WBC 5.4   HGB 15.0     BMP:    Recent Labs     03/16/23  0732   *   K 4.6   CL 93*   CO2 24   BUN 15   CREATININE 1.3   GLUCOSE 672*     Hepatic:   Recent Labs     03/16/23  0732   AST 22   ALT 31   BILITOT 0.4   ALKPHOS 182*       Discussed with ER NP.      Thank you No primary care provider on file. for the opportunity to be involved in this patient's care.    -----------------------------  Jailene Lezama MD  [facility] [place] hospitalist

## 2023-03-16 NOTE — ED TRIAGE NOTES
Patient came to ed complaint of high blood sugar. Patient states he is not diabetic and does not take anything for it. Patient states he does not know what level is was because his girl friend took it. Patient states it just read high so he came in. Patient states he has been peeing every hour. Patient denies pain or nausea  at this time.  Patient is ambulatory and a/o x4 at this time

## 2023-03-16 NOTE — PROGRESS NOTES
Patient refused 1700 metformin. Dr. Ernestina Delgdao notified  of patient refusing metformin. Patient sitting up in chair. Patient denies pain at present time.  Electronically signed by Campos Reynolds RN on 3/16/2023 at 6:04 PM

## 2023-03-16 NOTE — ED NOTES
Report called to Gillian HOWARD on [address] Transportation requested.      [place] RN  03/16/23 7609

## 2023-03-16 NOTE — PROGRESS NOTES
Medication Reconciliation    List of medications patient is currently taking is complete. Source of information: 1. Conversation with patient at bedside                                      2. EPIC records      Allergies  Patient has no known allergies. Notes regarding home medications:   1. Patient is not on any RX/OTC/herbal medications prior to admission.        Poli Hinojosa [facility], PharmD, 3/16/2023 11:23 AM

## 2023-03-16 NOTE — PROGRESS NOTES
[facility]  Diabetes Education   Progress Note       NAME:  [id number] RECORD NUMBER:  [id number]  AGE: 27 y.o. GENDER: male  : 1990  TODAY'S DATE:  3/16/2023    Subjective   Reason for Diabetic Education Evaluation and Assessment: new onset diabetes    Aury López reports a family history of diabetes. He had been experiencing excessive urination. Visit Type: evaluation      Diego Hodge is a 27 y.o. male referred by:     [x] Physician  [] Nursing  [] Chart Review   [] Other:     PAST MEDICAL HISTORY    No past medical history on file. PAST SURGICAL HISTORY    No past surgical history on file. FAMILY HISTORY    No family history on file. SOCIAL HISTORY    Social History     Tobacco Use    Smoking status: Never    Smokeless tobacco: Never   Substance Use Topics    Alcohol use: No    Drug use: No       ALLERGIES    No Known Allergies    MEDICATIONS     insulin glargine  0.15 Units/kg SubCUTAneous Daily    insulin lispro  0-8 Units SubCUTAneous TID WC    insulin lispro  0-4 Units SubCUTAneous Nightly    sodium chloride flush  5-40 mL IntraVENous 2 times per day    Vitamin D  2,000 Units Oral Daily    atorvastatin  10 mg Oral Nightly    metFORMIN  500 mg Oral BID WC       Objective        Patient Active Problem List   Diagnosis Code    New onset type 2 diabetes mellitus (HCC) E11.9        BP (!) 140/74   Pulse 61   Temp 98 °F (36.7 °C) (Oral)   Resp 16   Ht 6' 2\" (1.88 m)   Wt 223 lb 8.7 oz (101.4 kg)   SpO2 98%   BMI 28.70 kg/m²     HgBA1c:    Lab Results   Component Value Date/Time    LABA1C 12.2 2023 10:30 AM       Recent Labs     23  0654 23  1105   POCGLU 598* 368*       BUN/Creatinine:    Lab Results   Component Value Date/Time    BUN 15 2023 07:32 AM    CREATININE 1.3 2023 07:32 AM       Assessment        Diabetes Management and Education    Does the patient have a Primary Care Physician?  No     Does the patient require new medication instruction? Yes, anticipate insulin at discharge. Introduced insulin pens. Person responsible for administration of Insulin/Medication:        [x] Self     [] Caregiver       [] Spouse       [] Other Family Member   []  Other    Insulin Instruction:  insulin pen  Injection Site:   [x] location    [x] rotation     Level of patient/caregiver understanding able to:       [] Verbalized Understanding   [x] Demonstrated Understanding       [] Teach Back       [x] Needs Reinforcement     []  Other:        Does the patient/caregiver monitor Blood Glucoses? No: used a family member's glucometer. Reviewed glycemic control targets, testing frequency and when to call PCP. Level of patient/caregiver understanding able to:        [x] Verbalized Understanding   [] Demonstrated Understanding       [] Teach Back       [] Needs Reinforcement     []  Other:        Does the patient/caregiver follow a Meal Plan? No: Works night and days. Has been drinking juice and regular soda. Recommend making water, unsweetened tea or coffee primary drinks. Identified common carbs and portion sizes. Introduced [employer]. Targets 60 grams of carb with meals. Reviewed importance of eating three meals per day and plate method for consistent carb intake. Level of patient/caregiver understanding able to:       [x] Verbalized Understanding   [] Demonstrated Understanding       [] Teach Back       [] Needs Reinforcement     []  Other:      Does the patient/caregiver understand S/S of Hypoglycemia? No:   Reviewed symptoms, prevention and treatment. Level of patient/caregiver understanding able to:       [] Verbalized Understanding   [] Demonstrated Understanding       [] Teach Back       [x] Needs Reinforcement     []  Other:                    Does the patient/caregiver understand S/S of Hyperglycemia? Yes    Reviewed symptoms, prevention and treatment.     Level of patient/caregiver understanding able to:        [x] Verbalized Understanding   [] Demonstrated Understanding       [] Teach Back       [] Needs Reinforcement     []  Other:           Plan        Ongoing diabetes education and blood glucose monitoring.        Social Service Consult Made:  Yes    Recommend meds to beds.    Recommend follow up with [facility].      Referral to [facility] faxed.  Fact sheet provided.      The following educational and support materials were provided:  My contact information  The Diabetes Education Program:  Planning Healthy Meals   [employer] handout - Carbohydrate Counting for People with Diabetes                                         Discharge Plan:  Discharge needs: insulin pens and 4mm pen needles and glucometer/strips/lancets       Teaching Time Diabetes Education:  50 minutes     Electronically signed by JUSTIN Winston on 3/16/2023 at 2:27 PM

## 2023-03-16 NOTE — PROGRESS NOTES
4 Eyes Admission Assessment     I agree as the admission nurse that 2 RN's have performed a thorough Head to Toe Skin Assessment on the patient. ALL assessment sites listed below have been assessed on admission. Areas assessed by both nurses:   [x]   Head, Face, and Ears   [x]   Shoulders, Back, and Chest  [x]   Arms, Elbows, and Hands   [x]   Coccyx, Sacrum, and Ischum  [x]   Legs, Feet, and Heels        Does the Patient have Skin Breakdown?   No         Eduardo Prevention initiated:  No   Wound Care Orders initiated:  No      [facility] nurse consulted for Pressure Injury (Stage 3,4, Unstageable, DTI, NWPT, and Complex wounds):  No      Nurse 1 eSignature: Electronically signed by Lyudmila Guerra RN on 3/16/23 at 5:59 PM EDT    **SHARE this note so that the co-signing nurse is able to place an eSignature**    Nurse 2 eSignature: Electronically signed by Hola Green RN on 3/16/23 at 6:33 PM EDT

## 2023-03-16 NOTE — PLAN OF CARE
Problem: Discharge Planning  Goal: Discharge to home or other facility with appropriate resources  Flowsheets (Taken 3/16/2023 1732)  Discharge to home or other facility with appropriate resources:   Identify barriers to discharge with patient and caregiver   Identify discharge learning needs (meds, wound care, etc)   Refer to discharge planning if patient needs post-hospital services based on physician order or complex needs related to functional status, cognitive ability or social support system   Arrange for needed discharge resources and transportation as appropriate     Problem: Safety - Adult  Goal: Free from fall injury  Flowsheets (Taken 3/16/2023 1732)  Free From Fall Injury: Instruct family/caregiver on patient safety

## 2023-03-17 LAB
ALBUMIN SERPL-MCNC: 3.5 G/DL (ref 3.4–5)
ALBUMIN/GLOB SERPL: 1.2 {RATIO} (ref 1.1–2.2)
ALP SERPL-CCNC: 132 U/L (ref 40–129)
ALT SERPL-CCNC: 23 U/L (ref 10–40)
ANION GAP SERPL CALCULATED.3IONS-SCNC: 8 MMOL/L (ref 3–16)
APTT BLD: 24.4 SEC (ref 23–34.3)
AST SERPL-CCNC: 17 U/L (ref 15–37)
BASOPHILS # BLD: 0 K/UL (ref 0–0.2)
BASOPHILS NFR BLD: 0.2 %
BILIRUB SERPL-MCNC: 0.3 MG/DL (ref 0–1)
BUN SERPL-MCNC: 12 MG/DL (ref 7–20)
CALCIUM SERPL-MCNC: 8.6 MG/DL (ref 8.3–10.6)
CHLORIDE SERPL-SCNC: 105 MMOL/L (ref 99–110)
CO2 SERPL-SCNC: 24 MMOL/L (ref 21–32)
CREAT SERPL-MCNC: 1.1 MG/DL (ref 0.9–1.3)
DEPRECATED RDW RBC AUTO: 12.6 % (ref 12.4–15.4)
EOSINOPHIL # BLD: 0.1 K/UL (ref 0–0.6)
EOSINOPHIL NFR BLD: 1.7 %
FIBRINOGEN PPP-MCNC: 325 MG/DL (ref 207–509)
FOLATE SERPL-MCNC: 8.79 NG/ML (ref 4.78–24.2)
GFR SERPLBLD CREATININE-BSD FMLA CKD-EPI: >60 ML/MIN/{1.73_M2}
GLUCOSE BLD-MCNC: 227 MG/DL (ref 70–99)
GLUCOSE BLD-MCNC: 235 MG/DL (ref 70–99)
GLUCOSE BLD-MCNC: 257 MG/DL (ref 70–99)
GLUCOSE BLD-MCNC: 303 MG/DL (ref 70–99)
GLUCOSE SERPL-MCNC: 203 MG/DL (ref 70–99)
HAPTOGLOB SERPL-MCNC: 71 MG/DL (ref 30–200)
HAV IGM SERPL QL IA: NORMAL
HBV CORE IGM SERPL QL IA: NORMAL
HBV SURFACE AG SERPL QL IA: NORMAL
HCT VFR BLD AUTO: 41.5 % (ref 40.5–52.5)
HCV AB SERPL QL IA: NORMAL
HGB BLD-MCNC: 13.9 G/DL (ref 13.5–17.5)
INR PPP: 0.99 (ref 0.87–1.14)
LDH SERPL L TO P-CCNC: 157 U/L (ref 100–190)
LYMPHOCYTES # BLD: 1.4 K/UL (ref 1–5.1)
LYMPHOCYTES NFR BLD: 30.4 %
MAGNESIUM SERPL-MCNC: 1.9 MG/DL (ref 1.8–2.4)
MCH RBC QN AUTO: 28.7 PG (ref 26–34)
MCHC RBC AUTO-ENTMCNC: 33.5 G/DL (ref 31–36)
MCV RBC AUTO: 85.8 FL (ref 80–100)
MONOCYTES # BLD: 0.4 K/UL (ref 0–1.3)
MONOCYTES NFR BLD: 9.1 %
NEUTROPHILS # BLD: 2.8 K/UL (ref 1.7–7.7)
NEUTROPHILS NFR BLD: 58.6 %
PERFORMED ON: ABNORMAL
PHOSPHATE SERPL-MCNC: 3.1 MG/DL (ref 2.5–4.9)
PLATELET # BLD AUTO: 43 K/UL (ref 135–450)
PLATELET # BLD AUTO: 47 K/UL (ref 135–450)
PMV BLD AUTO: 10.8 FL (ref 5–10.5)
POTASSIUM SERPL-SCNC: 4 MMOL/L (ref 3.5–5.1)
PROT SERPL-MCNC: 6.2 G/DL (ref 6.4–8.2)
PROT SERPL-MCNC: 6.4 G/DL (ref 6.4–8.2)
PROTHROMBIN TIME: 13 SEC (ref 11.7–14.5)
RBC # BLD AUTO: 4.84 M/UL (ref 4.2–5.9)
SODIUM SERPL-SCNC: 137 MMOL/L (ref 136–145)
VIT B12 SERPL-MCNC: 835 PG/ML (ref 211–911)
WBC # BLD AUTO: 4.7 K/UL (ref 4–11)

## 2023-03-17 PROCEDURE — 94760 N-INVAS EAR/PLS OXIMETRY 1: CPT

## 2023-03-17 PROCEDURE — 2580000003 HC RX 258: Performed by: INTERNAL MEDICINE

## 2023-03-17 PROCEDURE — 80074 ACUTE HEPATITIS PANEL: CPT

## 2023-03-17 PROCEDURE — 86702 HIV-2 ANTIBODY: CPT

## 2023-03-17 PROCEDURE — 80053 COMPREHEN METABOLIC PANEL: CPT

## 2023-03-17 PROCEDURE — 87390 HIV-1 AG IA: CPT

## 2023-03-17 PROCEDURE — 6370000000 HC RX 637 (ALT 250 FOR IP): Performed by: INTERNAL MEDICINE

## 2023-03-17 PROCEDURE — 84155 ASSAY OF PROTEIN SERUM: CPT

## 2023-03-17 PROCEDURE — 85025 COMPLETE CBC W/AUTO DIFF WBC: CPT

## 2023-03-17 PROCEDURE — 83615 LACTATE (LD) (LDH) ENZYME: CPT

## 2023-03-17 PROCEDURE — 83010 ASSAY OF HAPTOGLOBIN QUANT: CPT

## 2023-03-17 PROCEDURE — 85610 PROTHROMBIN TIME: CPT

## 2023-03-17 PROCEDURE — 86701 HIV-1ANTIBODY: CPT

## 2023-03-17 PROCEDURE — 84165 PROTEIN E-PHORESIS SERUM: CPT

## 2023-03-17 PROCEDURE — 85049 AUTOMATED PLATELET COUNT: CPT

## 2023-03-17 PROCEDURE — 1200000000 HC SEMI PRIVATE

## 2023-03-17 PROCEDURE — 84100 ASSAY OF PHOSPHORUS: CPT

## 2023-03-17 PROCEDURE — 82607 VITAMIN B-12: CPT

## 2023-03-17 PROCEDURE — 83883 ASSAY NEPHELOMETRY NOT SPEC: CPT

## 2023-03-17 PROCEDURE — 85384 FIBRINOGEN ACTIVITY: CPT

## 2023-03-17 PROCEDURE — 36415 COLL VENOUS BLD VENIPUNCTURE: CPT

## 2023-03-17 PROCEDURE — 82746 ASSAY OF FOLIC ACID SERUM: CPT

## 2023-03-17 PROCEDURE — 83735 ASSAY OF MAGNESIUM: CPT

## 2023-03-17 PROCEDURE — 85730 THROMBOPLASTIN TIME PARTIAL: CPT

## 2023-03-17 RX ORDER — INSULIN GLARGINE 100 [IU]/ML
17 INJECTION, SOLUTION SUBCUTANEOUS DAILY
Status: DISCONTINUED | OUTPATIENT
Start: 2023-03-18 | End: 2023-03-18 | Stop reason: HOSPADM

## 2023-03-17 RX ORDER — VITAMIN B COMPLEX
5000 TABLET ORAL DAILY
Status: DISCONTINUED | OUTPATIENT
Start: 2023-03-18 | End: 2023-03-18 | Stop reason: HOSPADM

## 2023-03-17 RX ADMIN — INSULIN GLARGINE 15 UNITS: 100 INJECTION, SOLUTION SUBCUTANEOUS at 09:14

## 2023-03-17 RX ADMIN — ATORVASTATIN CALCIUM 10 MG: 10 TABLET, FILM COATED ORAL at 20:34

## 2023-03-17 RX ADMIN — Medication 2000 UNITS: at 09:13

## 2023-03-17 RX ADMIN — INSULIN LISPRO 6 UNITS: 100 INJECTION, SOLUTION INTRAVENOUS; SUBCUTANEOUS at 11:36

## 2023-03-17 RX ADMIN — Medication 10 ML: at 09:18

## 2023-03-17 RX ADMIN — INSULIN LISPRO 4 UNITS: 100 INJECTION, SOLUTION INTRAVENOUS; SUBCUTANEOUS at 16:44

## 2023-03-17 RX ADMIN — INSULIN LISPRO 2 UNITS: 100 INJECTION, SOLUTION INTRAVENOUS; SUBCUTANEOUS at 09:13

## 2023-03-17 ASSESSMENT — PAIN SCALES - GENERAL
PAINLEVEL_OUTOF10: 0

## 2023-03-17 NOTE — PROGRESS NOTES
[facility]  Diabetes Education   Progress Note       NAME:  [id number] RECORD NUMBER:  [id number]  AGE: 27 y.o. GENDER: male  : 1990  TODAY'S DATE:  3/17/2023    Subjective   Reason for Diabetic Education Evaluation and Assessment: new onset diabetes     Isabela reports decreased urination that allowed him to sleep last evening. He is hoping for discharge today. He declined Metformin this morning. His wife was concerned about side effects. Visit Type: follow-up      Marsha Lennon is a 27 y.o. male referred by:     [x] Physician  [] Nursing  [] Chart Review   [] Other:     PAST MEDICAL HISTORY    No past medical history on file. PAST SURGICAL HISTORY    No past surgical history on file. FAMILY HISTORY    No family history on file.     SOCIAL HISTORY    Social History     Tobacco Use    Smoking status: Never    Smokeless tobacco: Never   Substance Use Topics    Alcohol use: No    Drug use: No       ALLERGIES    No Known Allergies    MEDICATIONS     [START ON 3/18/2023] insulin glargine  17 Units SubCUTAneous Daily    [START ON 3/18/2023] Vitamin D  5,000 Units Oral Daily    insulin lispro  0-8 Units SubCUTAneous TID WC    insulin lispro  0-4 Units SubCUTAneous Nightly    sodium chloride flush  5-40 mL IntraVENous 2 times per day    atorvastatin  10 mg Oral Nightly    metFORMIN  500 mg Oral BID WC       Objective        Patient Active Problem List   Diagnosis Code    New onset type 2 diabetes mellitus (HCC) E11.9        /67   Pulse 71   Temp 97.6 °F (36.4 °C) (Oral)   Resp 16   Ht 6' 2\" (1.88 m)   Wt 225 lb 1.4 oz (102.1 kg)   SpO2 98%   BMI 28.90 kg/m²     HgBA1c:    Lab Results   Component Value Date/Time    LABA1C 12.2 2023 10:30 AM       Recent Labs     23  1105 23  1630 23  2135 23  0623   POCGLU 368* 243* 308* 227*       BUN/Creatinine:    Lab Results   Component Value Date/Time    BUN 12 2023 04:58 AM    CREATININE 1.1 03/17/2023 04:58 AM       Assessment        Diabetes Management and Education    Does the patient have a Primary Care Physician? No     Does the patient require new medication instruction? Yes  Reviewed Metformin benefits and side effects. Reviewed insulin pen. Person responsible for administration of Insulin/Medication:        [x] Self     [] Caregiver       [] Spouse       [] Other Family Member   []  Other    Insulin Instruction:  insulin pen  Injection Site:   [x] location    [x] rotation     Level of patient/caregiver understanding able to:      [] Verbalized Understanding   [] Demonstrated Understanding       [] Teach Back       [x] Needs Reinforcement     []  Other:        Does the patient/caregiver monitor Blood Glucoses? No: He has experience using a family members meter. Reviewed glycemic control targets, testing frequency and when to call PCP. Level of patient/caregiver understanding able to:        [x] Verbalized Understanding   [] Demonstrated Understanding       [] Teach Back       [] Needs Reinforcement     []  Other:        Does the patient/caregiver follow a Meal Plan? No:   Recommend making water, unsweetened tea or coffee primary drinks. Reviewed importance of eating three meals per day and plate method for consistent carb intake. Level of patient/caregiver understanding able to:      [x] Verbalized Understanding   [] Demonstrated Understanding       [] Teach Back       [] Needs Reinforcement     []  Other:      Does the patient/caregiver understand S/S of Hypoglycemia? Yes  Reviewed symptoms, prevention and treatment. Level of patient/caregiver understanding able to:       [x] Verbalized Understanding   [] Demonstrated Understanding       [] Teach Back       [] Needs Reinforcement     []  Other:                    Does the patient/caregiver understand S/S of Hyperglycemia? Yes    Reviewed symptoms, prevention and treatment.     Level of patient/caregiver understanding able to:      [x] Verbalized Understanding   [] Demonstrated Understanding       [] Teach Back       [] Needs Reinforcement     []  Other:           Plan        Ongoing diabetes education and blood glucose monitoring. Notified Antoinette Ambrose MD about declining Metformin this AM.      Recommend Meds to beds. [facility].                                              Discharge Plan:  Discharge needs: insulin pens and 4mm pen needles and glucometer/strips/lancets       Teaching Time Diabetes Education:  30 minutes     Electronically signed by Janneth Alfaro on 3/17/2023 at 10:06 AM

## 2023-03-17 NOTE — PROGRESS NOTES
Hospitalist Progress Note      PCP: No primary care provider on file. Date of Admission: 3/16/2023    Subjective: Is better, not thirsty, no abdominal pain dizziness lightheadedness. Medications:  Reviewed    Infusion Medications    sodium chloride 125 mL/hr at 03/16/23 2201    dextrose      sodium chloride       Scheduled Medications    insulin glargine  0.15 Units/kg SubCUTAneous Daily    insulin lispro  0-8 Units SubCUTAneous TID WC    insulin lispro  0-4 Units SubCUTAneous Nightly    sodium chloride flush  5-40 mL IntraVENous 2 times per day    Vitamin D  2,000 Units Oral Daily    atorvastatin  10 mg Oral Nightly    metFORMIN  500 mg Oral BID WC     PRN Meds: glucose, dextrose bolus **OR** dextrose bolus, glucagon (rDNA), dextrose, sodium chloride flush, sodium chloride, ondansetron **OR** ondansetron, polyethylene glycol, acetaminophen **OR** acetaminophen      Intake/Output Summary (Last 24 hours) at 3/17/2023 0916  Last data filed at 3/16/2023 1800  Gross per 24 hour   Intake 3445 ml   Output 3 ml   Net 3442 ml       Physical Exam Performed:    /67   Pulse 71   Temp 97.6 °F (36.4 °C) (Oral)   Resp 16   Ht 6' 2\" (1.88 m)   Wt 225 lb 1.4 oz (102.1 kg)   SpO2 98%   BMI 28.90 kg/m²     General appearance: No apparent distress  Neck: Supple  Respiratory:  Normal respiratory effort. Clear to auscultation, bilaterally without Rales/Wheezes/Rhonchi. Cardiovascular: Regular rate and rhythm with normal S1/S2 without murmurs, rubs or gallops. Abdomen: Soft, non-tender, non-distended   Musculoskeletal: No clubbing, cyanosis   Skin: Skin color, texture, turgor normal.  No rashes or lesions.   Neurologic:  No focal weakness   Psychiatric: Alert and oriented  Capillary Refill: Brisk, 3 seconds, normal   Peripheral Pulses: +2 palpable, equal bilaterally       Labs:   Recent Labs     03/16/23  0732 03/17/23  0458   WBC 5.4 4.7   HGB 15.0 13.9   HCT 45.8 41.5   PLT 54* 47*     Recent Labs 03/16/23  0732 03/17/23  0458   * 137   K 4.6 4.0   CL 93* 105   CO2 24 24   BUN 15 12   CREATININE 1.3 1.1   CALCIUM 9.5 8.6   PHOS  --  3.1     Recent Labs     03/16/23  0732 03/17/23 0458   AST 22 17   ALT 31 23   BILITOT 0.4 0.3   ALKPHOS 182* 132*     No results for input(s): INR in the last 72 hours. No results for input(s): [employer] in the last 72 hours. Urinalysis:      Lab Results   Component Value Date/Time    NITRU Negative 03/16/2023 07:32 AM    BLOODU Negative 03/16/2023 07:32 AM    SPECGRAV 1.033 03/16/2023 07:32 AM    GLUCOSEU >=1000 03/16/2023 07:32 AM       Radiology:  No orders to display       IP CONSULT TO DIABETES EDUCATOR    Assessment/Plan:    Active Hospital Problems    Diagnosis     New onset type 2 diabetes mellitus ([facility] [address] [E11.9]      Priority: Medium     Hyperglycemia with new onset or new diagnosis diabetes, patient given IV fluid started on Lantus along with sliding scale and scheduled metformin diabetic educator consulted. Hemoglobin A1c 12.2 patient will be discharged on insulin and further management should be per primary and likely will need endocrinology follow-up or referral at discharge. Hyponatremia/pseudohyponatremia due to hyperglycemia improved IV fluid  Elevated alkaline phosphatase with hypovitaminosis D started on supplement  Hyperlipidemia started on Lipitor  Thrombocytopenia monitor closely, worsening do not see relevant reason we will consult hematology for recommendations    Diet: ADULT DIET;  Regular; 4 carb choices (60 gm/meal)  Code Status: Full Code      Nazanin Raya MD
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
## 2023-03-17 NOTE — PROGRESS NOTES
CLINICAL PHARMACY NOTE: MEDS TO BEDS    Discharge medications are ready in inpatient pharmacy for weekend delivery.     03/17/23 3:12 PM

## 2023-03-17 NOTE — PLAN OF CARE
Problem: Discharge Planning  Goal: Discharge to home or other facility with appropriate resources  3/17/2023 0937 by Jono Quiñones RN  Outcome: Progressing  Flowsheets (Taken 3/17/2023 4359)  Discharge to home or other facility with appropriate resources: Identify barriers to discharge with patient and caregiver  3/17/2023 0152 by Genaro Dominguez RN  Outcome: Progressing  Flowsheets (Taken 3/17/2023 0128)  Discharge to home or other facility with appropriate resources: Identify barriers to discharge with patient and caregiver     Problem: Safety - Adult  Goal: Free from fall injury  3/17/2023 0937 by Jono Quiñones RN  Outcome: Progressing  Flowsheets (Taken 3/17/2023 0937)  Free From Fall Injury: Instruct family/caregiver on patient safety  3/17/2023 0152 by Genaro Dominguez RN  Outcome: Progressing     Problem: ABCDS Injury Assessment  Goal: Absence of physical injury  3/17/2023 0937 by Jono Quiñones RN  Outcome: Progressing  Flowsheets (Taken 3/17/2023 0937)  Absence of Physical Injury: Implement safety measures based on patient assessment  3/17/2023 0152 by Genaro Dominguez RN  Outcome: Progressing     Problem: Pain  Goal: Verbalizes/displays adequate comfort level or baseline comfort level  3/17/2023 0937 by Jono Quiñones RN  Outcome: Progressing  Flowsheets (Taken 3/17/2023 7143)  Verbalizes/displays adequate comfort level or baseline comfort level:   Encourage patient to monitor pain and request assistance   Assess pain using appropriate pain scale   Administer analgesics based on type and severity of pain and evaluate response  3/17/2023 0152 by Genaro Dominguez RN  Outcome: Progressing   Electronically signed by Rosa Brandt RN on 3/17/2023 at 9:38 AM

## 2023-03-17 NOTE — PLAN OF CARE
Problem: Discharge Planning  Goal: Discharge to home or other facility with appropriate resources  3/17/2023 0152 by Skyler Garrido RN  Outcome: Progressing  Flowsheets (Taken 3/17/2023 0128)  Discharge to home or other facility with appropriate resources: Identify barriers to discharge with patient and caregiver     Problem: Safety - Adult  Goal: Free from fall injury  3/17/2023 0152 by Skyler Garrido RN  Outcome: Progressing     Problem: ABCDS Injury Assessment  Goal: Absence of physical injury  Outcome: Progressing     Problem: Pain  Goal: Verbalizes/displays adequate comfort level or baseline comfort level  Outcome: Progressing

## 2023-03-17 NOTE — PROGRESS NOTES
Patient up in chair. Alert and oriented x4. Patient denies any pain. Patient morning meds passed. Patient denied metformin due to it causing \"stomach issues\". Patient denies any needs at this time. Inpt consult to hemo noted.   Electronically signed by Matilda Jewell RN on 3/17/2023 at 11:14 AM

## 2023-03-17 NOTE — CARE COORDINATION
Case Management Assessment  Initial Evaluation    Date/Time of Evaluation: 3/17/2023 12:10 PM  Assessment Completed by: JONATHAN Ying    If patient is discharged prior to next notation, then this note serves as note for discharge by case management. Additional Case Management Notes: Patient plans to return home at discharge. He confirmed he works but has no insurance. He is able to drive himself to appointments. Patient may need MEDS TO BEDS and kaylan meds. RN aware. Patient Name: Jaret Sun                   YOB: 1990  Diagnosis: Hyperglycemia [R73.9]  Diabetes mellitus, new onset ([facility] [address] [E11.9]  Goals of care, counseling/discussion [Z71.89]  New onset type 2 diabetes mellitus ([facility] [address] [E11.9]                   Date / Time: 3/16/2023  6:47 AM    Patient Admission Status: Inpatient   Readmission Risk (Low < 19, Mod (19-27), High > 27): Readmission Risk Score: 4.9    Current PCP: No primary care provider on file. PCP verified by CM? No (no PCP--gave list)    Chart Reviewed: Yes      History Provided by: Patient  Patient Orientation: Alert and Oriented, Person, Place, Situation, Self    Patient Cognition: Alert    Hospitalization in the last 30 days (Readmission):  No    If yes, Readmission Assessment in CM Navigator will be completed. Advance Directives:      Code Status: Full Code   Patient's Primary Decision Maker is: Legal Next of Kin    Primary Decision Maker: Heavenly Dangelo - [facility] - [phone]    Discharge Planning:    Patient lives with: Family Members Type of Home: House  Primary Care Giver: Self  Patient Support Systems include: Family Members     Current community resources: [facility] (Comment) ([facility]  Current services prior to admission: None            Current DME:              Type of Home Care services:  None    ADLS  Prior functional level: Independent in ADLs/IADLs  Current functional level:  Independent in ADLs/IADLs    PT AM-PAC: /24  OT AM-PAC:   /24    Family can provide assistance at DC: Yes  Would you like Case Management to discuss the discharge plan with any other family members/significant others, and if so, who? No  Plans to Return to Present Housing: Yes  Other Identified Issues/Barriers to RETURNING to current housing: yes  Potential Assistance needed at discharge: Prescription Assistance            Potential DME:  no  Patient expects to discharge to: [address] [place] [address] for transportation at discharge: Other (see comment) (family)    Financial    Payor: /     Potential assistance Purchasing Medications: Yes  Meds-to-Beds request: Yes      [facility] [address] [facility], [address] [facility]  [address] [facility]  [address] [address]  Phone: [phone] Fax: [phone]    Vale Thorndike. [address]  [address] [facility] [address]  Phone: [phone] Fax: [phone]      Notes:    Factors facilitating achievement of predicted outcomes: Family support and Cooperative    Barriers to discharge: No insurance coverage    Additional Case Management Notes: Patient plans to return home at discharge. He confirmed he works but has no insurance. He is able to drive himself to appointments. Patient may need MEDS TO BEDS and kaylan meds. RN aware.      The Plan for Transition of Care is related to the following treatment goals of Hyperglycemia [R73.9]  Diabetes mellitus, new onset ([address] [E11.9]  Goals of care, counseling/discussion [Z71.89]  New onset type 2 diabetes mellitus ([address] [E11.9]    The Patient and/or Patient Representative Agree with the Discharge Plan?  yes    Electronically signed by Glo Phoenix, MSW, LISW, Case Management on 3/17/2023 at 12:12 PM  Yony Cabezas

## 2023-03-18 VITALS
BODY MASS INDEX: 28.69 KG/M2 | HEART RATE: 51 BPM | OXYGEN SATURATION: 97 % | HEIGHT: 74 IN | TEMPERATURE: 97.8 F | WEIGHT: 223.55 LBS | DIASTOLIC BLOOD PRESSURE: 61 MMHG | RESPIRATION RATE: 16 BRPM | SYSTOLIC BLOOD PRESSURE: 96 MMHG

## 2023-03-18 LAB
ALBUMIN SERPL-MCNC: 3.3 G/DL (ref 3.4–5)
ALBUMIN/GLOB SERPL: 1.1 {RATIO} (ref 1.1–2.2)
ALP SERPL-CCNC: 130 U/L (ref 40–129)
ALT SERPL-CCNC: 22 U/L (ref 10–40)
ANION GAP SERPL CALCULATED.3IONS-SCNC: 10 MMOL/L (ref 3–16)
AST SERPL-CCNC: 16 U/L (ref 15–37)
BASOPHILS # BLD: 0 K/UL (ref 0–0.2)
BASOPHILS NFR BLD: 0.4 %
BILIRUB SERPL-MCNC: 0.3 MG/DL (ref 0–1)
BUN SERPL-MCNC: 11 MG/DL (ref 7–20)
CALCIUM SERPL-MCNC: 8.9 MG/DL (ref 8.3–10.6)
CHLORIDE SERPL-SCNC: 103 MMOL/L (ref 99–110)
CO2 SERPL-SCNC: 23 MMOL/L (ref 21–32)
CREAT SERPL-MCNC: 1 MG/DL (ref 0.9–1.3)
DEPRECATED RDW RBC AUTO: 12.8 % (ref 12.4–15.4)
EOSINOPHIL # BLD: 0.1 K/UL (ref 0–0.6)
EOSINOPHIL NFR BLD: 1.2 %
GFR SERPLBLD CREATININE-BSD FMLA CKD-EPI: >60 ML/MIN/{1.73_M2}
GLUCOSE BLD-MCNC: 225 MG/DL (ref 70–99)
GLUCOSE BLD-MCNC: 234 MG/DL (ref 70–99)
GLUCOSE BLD-MCNC: 285 MG/DL (ref 70–99)
GLUCOSE SERPL-MCNC: 215 MG/DL (ref 70–99)
HCT VFR BLD AUTO: 45.3 % (ref 40.5–52.5)
HGB BLD-MCNC: 15.1 G/DL (ref 13.5–17.5)
HIV 1+2 AB+HIV1 P24 AG SERPL QL IA: NORMAL
HIV 2 AB SERPL QL IA: NORMAL
HIV1 AB SERPL QL IA: NORMAL
HIV1 P24 AG SERPL QL IA: NORMAL
KAPPA LC FREE SER-MCNC: 18.96 MG/L (ref 3.3–19.4)
KAPPA LC FREE/LAMBDA FREE SER: 1.24 {RATIO} (ref 0.26–1.65)
LAMBDA LC FREE SERPL-MCNC: 15.32 MG/L (ref 5.71–26.3)
LYMPHOCYTES # BLD: 1.3 K/UL (ref 1–5.1)
LYMPHOCYTES NFR BLD: 27.1 %
MCH RBC QN AUTO: 28.7 PG (ref 26–34)
MCHC RBC AUTO-ENTMCNC: 33.3 G/DL (ref 31–36)
MCV RBC AUTO: 86.3 FL (ref 80–100)
MONOCYTES # BLD: 0.3 K/UL (ref 0–1.3)
MONOCYTES NFR BLD: 7.1 %
NEUTROPHILS # BLD: 3.1 K/UL (ref 1.7–7.7)
NEUTROPHILS NFR BLD: 64.2 %
PERFORMED ON: ABNORMAL
PLATELET # BLD AUTO: 42 K/UL (ref 135–450)
PMV BLD AUTO: 10.8 FL (ref 5–10.5)
POTASSIUM SERPL-SCNC: 3.9 MMOL/L (ref 3.5–5.1)
PROT SERPL-MCNC: 6.2 G/DL (ref 6.4–8.2)
RBC # BLD AUTO: 5.24 M/UL (ref 4.2–5.9)
RPT COMMENT: NORMAL
SODIUM SERPL-SCNC: 136 MMOL/L (ref 136–145)
WBC # BLD AUTO: 4.9 K/UL (ref 4–11)

## 2023-03-18 PROCEDURE — 2580000003 HC RX 258: Performed by: INTERNAL MEDICINE

## 2023-03-18 PROCEDURE — 36415 COLL VENOUS BLD VENIPUNCTURE: CPT

## 2023-03-18 PROCEDURE — 80053 COMPREHEN METABOLIC PANEL: CPT

## 2023-03-18 PROCEDURE — 6370000000 HC RX 637 (ALT 250 FOR IP): Performed by: INTERNAL MEDICINE

## 2023-03-18 PROCEDURE — 94760 N-INVAS EAR/PLS OXIMETRY 1: CPT

## 2023-03-18 PROCEDURE — 85025 COMPLETE CBC W/AUTO DIFF WBC: CPT

## 2023-03-18 RX ORDER — INSULIN GLARGINE 100 [IU]/ML
19 INJECTION, SOLUTION SUBCUTANEOUS DAILY
Qty: 5 ADJUSTABLE DOSE PRE-FILLED PEN SYRINGE | Refills: 0 | Status: SHIPPED | OUTPATIENT
Start: 2023-03-18 | End: 2023-03-18 | Stop reason: SDUPTHER

## 2023-03-18 RX ORDER — ATORVASTATIN CALCIUM 10 MG/1
10 TABLET, FILM COATED ORAL NIGHTLY
Qty: 30 TABLET | Refills: 0 | Status: SHIPPED | OUTPATIENT
Start: 2023-03-18

## 2023-03-18 RX ORDER — ATORVASTATIN CALCIUM 10 MG/1
10 TABLET, FILM COATED ORAL NIGHTLY
Qty: 30 TABLET | Refills: 0 | Status: SHIPPED | OUTPATIENT
Start: 2023-03-18 | End: 2023-03-18 | Stop reason: SDUPTHER

## 2023-03-18 RX ORDER — INSULIN LISPRO 100 [IU]/ML
INJECTION, SOLUTION INTRAVENOUS; SUBCUTANEOUS
Qty: 2 ADJUSTABLE DOSE PRE-FILLED PEN SYRINGE | Refills: 0 | Status: SHIPPED | OUTPATIENT
Start: 2023-03-18 | End: 2023-03-18 | Stop reason: SDUPTHER

## 2023-03-18 RX ORDER — ERGOCALCIFEROL 1.25 MG/1
50000 CAPSULE ORAL WEEKLY
Qty: 4 CAPSULE | Refills: 0 | Status: SHIPPED | OUTPATIENT
Start: 2023-03-20 | End: 2023-03-18 | Stop reason: SDUPTHER

## 2023-03-18 RX ORDER — INSULIN GLARGINE 100 [IU]/ML
19 INJECTION, SOLUTION SUBCUTANEOUS DAILY
Qty: 5 ADJUSTABLE DOSE PRE-FILLED PEN SYRINGE | Refills: 0 | Status: SHIPPED | OUTPATIENT
Start: 2023-03-18

## 2023-03-18 RX ORDER — ERGOCALCIFEROL 1.25 MG/1
50000 CAPSULE ORAL WEEKLY
Qty: 4 CAPSULE | Refills: 0 | Status: SHIPPED | OUTPATIENT
Start: 2023-03-20

## 2023-03-18 RX ORDER — INSULIN LISPRO 100 [IU]/ML
INJECTION, SOLUTION INTRAVENOUS; SUBCUTANEOUS
Qty: 2 ADJUSTABLE DOSE PRE-FILLED PEN SYRINGE | Refills: 0 | Status: SHIPPED | OUTPATIENT
Start: 2023-03-18

## 2023-03-18 RX ADMIN — Medication 5000 UNITS: at 08:52

## 2023-03-18 RX ADMIN — INSULIN LISPRO 4 UNITS: 100 INJECTION, SOLUTION INTRAVENOUS; SUBCUTANEOUS at 12:30

## 2023-03-18 RX ADMIN — Medication 10 ML: at 08:53

## 2023-03-18 RX ADMIN — INSULIN GLARGINE 17 UNITS: 100 INJECTION, SOLUTION SUBCUTANEOUS at 08:53

## 2023-03-18 RX ADMIN — INSULIN LISPRO 2 UNITS: 100 INJECTION, SOLUTION INTRAVENOUS; SUBCUTANEOUS at 08:53

## 2023-03-18 ASSESSMENT — PAIN SCALES - GENERAL: PAINLEVEL_OUTOF10: 0

## 2023-03-18 NOTE — PLAN OF CARE
Problem: Discharge Planning  Goal: Discharge to home or other facility with appropriate resources  3/18/2023 0908 by Itz Land RN  Outcome: Progressing  Flowsheets (Taken 3/18/2023 0908)  Discharge to home or other facility with appropriate resources:   Identify barriers to discharge with patient and caregiver   Identify discharge learning needs (meds, wound care, etc)   Arrange for needed discharge resources and transportation as appropriate     Problem: Safety - Adult  Goal: Free from fall injury  3/18/2023 0908 by Itz Land RN  Outcome: Progressing  Flowsheets (Taken 3/18/2023 0908)  Free From Fall Injury: Instruct family/caregiver on patient safety     Problem: ABCDS Injury Assessment  Goal: Absence of physical injury  3/18/2023 0908 by Itz Land RN  Outcome: Progressing  Flowsheets (Taken 3/18/2023 0908)  Absence of Physical Injury: Implement safety measures based on patient assessment     Problem: Pain  Goal: Verbalizes/displays adequate comfort level or baseline comfort level  3/18/2023 0908 by Itz Land RN  Outcome: Progressing  Flowsheets (Taken 3/18/2023 0908)  Verbalizes/displays adequate comfort level or baseline comfort level:   Assess pain using appropriate pain scale   Encourage patient to monitor pain and request assistance   Administer analgesics based on type and severity of pain and evaluate response
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
## 2023-03-18 NOTE — DISCHARGE SUMMARY
Hospital Medicine Discharge Summary    Patient ID: Nely Ontiveros      Patient's PCP: No primary care provider on file. Admit Date: 3/16/2023     Discharge Date:   03/18/2023    Admitting Provider: Zachary De León MD     Discharge Provider: Gloria Celis MD     Discharge Diagnoses: Active Hospital Problems    Diagnosis     New onset type 2 diabetes mellitus ([facility] [address] [E11.9]      Priority: Medium       The patient was seen and examined on day of discharge and this discharge summary is in conjunction with any daily progress note from day of discharge. Hospital Course:     From HPI:\"This is a pleasant 78-year-old male with no significant past medical history, who reports family history of diabetes, who presents to the emergency room with complaints of polyuria, polydipsia, ongoing for about 2 weeks. He does not have polyphagia. No chest pain or shortness of breath. No other urinary symptoms evidence of infectious process. His girlfriend checked his blood sugar, noted to be elevated, at which point he presents to the emergency room for evaluation. He was noted to have blood glucose greater than 600 in the emergency room, and hospital medicine service consulted for admission for further evaluation. \"      Hyperglycemia with new onset or new diagnosis diabetes, patient given IV fluid started on Lantus along with sliding scale and scheduled metformin diabetic educator consulted. Hemoglobin A1c 12.2 patient will be discharged on insulin and further management should be per primary and likely will need endocrinology follow-up or referral at discharge.   Discussed plan again with patient today verbalized understanding and willingness to follow-up Lantus adjusted to 19 units likely will need further adjustment as outpatient  Hyponatremia/pseudohyponatremia due to hyperglycemia improved IV fluid  Elevated alkaline phosphatase with hypovitaminosis D started on supplement, needs follow-up with endocrinology also  Hyperlipidemia started on Lipitor  Thrombocytopenia monitor closely, worsening do not see relevant reason, consulted hematology work-up ordered follow-up as outpatient, discussed with patient verbalized understanding and willingness to follow-up with Dr. Cely Hastings        Physical Exam Performed:     BP 96/61   Pulse 51   Temp 97.8 °F (36.6 °C) (Oral)   Resp 16   Ht 6' 2\" (1.88 m)   Wt 223 lb 8.7 oz (101.4 kg)   SpO2 97%   BMI 28.70 kg/m²       General appearance:  No apparent distress  Neck: Supple  Respiratory:  Normal respiratory effort. Clear to auscultation, bilaterally without Rales/Wheezes/Rhonchi. Cardiovascular:  Regular rate and rhythm with normal S1/S2 without murmurs, rubs or gallops. Abdomen: Soft, non-tender  Musculoskeletal:  No clubbing, cyanosis  Skin: Skin color, texture, turgor normal.  No rashes or lesions. Neurologic: No focal weakness  Psychiatric:  Alert and oriented  Capillary Refill: Brisk,< 3 seconds   Peripheral Pulses: +2 palpable, equal bilaterally       Labs:  For convenience and continuity at follow-up the following most recent labs are provided:      CBC:    Lab Results   Component Value Date/Time    WBC 4.9 03/18/2023 09:06 AM    HGB 15.1 03/18/2023 09:06 AM    HCT 45.3 03/18/2023 09:06 AM    PLT 42 03/18/2023 09:06 AM       Renal:    Lab Results   Component Value Date/Time     03/18/2023 05:06 AM    K 3.9 03/18/2023 05:06 AM    K 4.6 03/16/2023 07:32 AM     03/18/2023 05:06 AM    CO2 23 03/18/2023 05:06 AM    BUN 11 03/18/2023 05:06 AM    CREATININE 1.0 03/18/2023 05:06 AM    CALCIUM 8.9 03/18/2023 05:06 AM    PHOS 3.1 03/17/2023 04:58 AM         Significant Diagnostic Studies    Radiology:   No orders to display          Consults:     IP CONSULT TO DIABETES EDUCATOR  IP CONSULT TO HEM/ONC    Disposition:  home     Condition at Discharge: Stable    Discharge Instructions/Follow-up:  PCP, Hematology, endocrinology    Code Status:  Full Code Activity: activity as tolerated    Diet: diabetic diet      Discharge Medications:     Current Discharge Medication List             Details   Insulin Pen Needle 32G X 4 MM MISC 1 each by Does not apply route daily  Qty: 100 each, Refills: 0      insulin lispro, 1 Unit Dial, (HUMALOG KWIKPEN) 100 UNIT/ML SOPN If blood glucose  take No Insulin, 200-249 take 2 Units SC, 250-299 take 4 Units SC, 300-349 take 6 Units SC, Over 349 take 8 Units SC  Qty: 2 Adjustable Dose Pre-filled Pen Syringe, Refills: 0      insulin glargine (LANTUS SOLOSTAR) 100 UNIT/ML injection pen Inject 19 Units into the skin daily  Qty: 5 Adjustable Dose Pre-filled Pen Syringe, Refills: 0      metFORMIN (GLUCOPHAGE) 500 MG tablet Take 1 tablet by mouth 2 times daily (with meals)  Qty: 60 tablet, Refills: 0      atorvastatin (LIPITOR) 10 MG tablet Take 1 tablet by mouth nightly  Qty: 30 tablet, Refills: 0      vitamin D (ERGOCALCIFEROL) 1.25 MG (87405 UT) CAPS capsule Take 1 capsule by mouth once a week  Qty: 4 capsule, Refills: 0      Blood Glucose Monitoring Suppl (TRUE METRIX METER) w/Device KIT 1 kit by Does not apply route 4 times daily (before meals and nightly)  Qty: 1 kit, Refills: 0    Associated Diagnoses: New onset type 2 diabetes mellitus (HCC)      blood glucose test strips (TRUE METRIX BLOOD GLUCOSE TEST) strip 1 each by In Vitro route 4 times daily (before meals and nightly) As needed. Qty: 200 each, Refills: 3    Associated Diagnoses: New onset type 2 diabetes mellitus ([address]      Lancets MISC 1 each by Does not apply route 4 times daily  Qty: 200 each, Refills: 0    Associated Diagnoses: New onset type 2 diabetes mellitus ([address]             Time Spent on discharge: 45 minutes in the examination, evaluation, counseling and review of medications and discharge plan. Signed:    Nicole Loera MD   3/18/2023      Thank you No primary care provider on file. for the opportunity to be involved in this patient's care.  If you have any questions or concerns, please feel free to contact me at [phone].

## 2023-03-18 NOTE — PROGRESS NOTES
Clinical Pharmacy Note  Medication Counseling    Reviewed new medications started during hospital admission: insulin, metformin. Indications and side effects were emphasized during counseling. All medication-related questions addressed. Patient verbalized understanding of education. Should the patient express any additional questions or concerns regarding their medications, please do not hesitate to contact the pharmacy department. Patient/caregiver aware they may refuse medications during hospital stay. 20 minutes spent educating patient regarding medications.

## 2023-03-18 NOTE — PROGRESS NOTES
Data- discharge order received, pt verbalized agreement to discharge, disposition to previous residence, no needs for HHC/DME. Action- discharge instructions prepared and given to patient, pt verbalized understanding. Medication information packet given r/t NEW and/or CHANGED prescriptions emphasizing name/purpose/side effects, pt verbalized understanding. Discharge instruction summary: Diet- general, Activity- as tolerated, Primary Care Physician as follows: No primary care provider on file. None f/u appointment scheduled by patient, PCP list provided, immunizations reviewed and up to date, prescription medications filled at retail pharmacy and with written prescriptions. Response- Pt belongings gathered, IV removed. Disposition is home (no HHC/DME needs), transported with belongings, taken to [facility] ambulatory with family, no complications. Significant other transporting home.

## 2023-03-18 NOTE — PROGRESS NOTES
Pt is alert and oriented x4, resting quietly in bed. Nightly medications and intake tolerated well. No complaints of nausea, vomiting, or pain.  tonight - no insulin intervention. Fall precautions in place. Call light within reach. No other needs made known at this time. Will continue to monitor.     Electronically signed by Ish Villarreal RN on 3/18/2023 at 3:27 AM

## 2023-03-18 NOTE — PLAN OF CARE
Problem: Discharge Planning  Goal: Discharge to home or other facility with appropriate resources  Outcome: Progressing  Flowsheets (Taken 3/18/2023 0327)  Discharge to home or other facility with appropriate resources:   Identify barriers to discharge with patient and caregiver   Arrange for needed discharge resources and transportation as appropriate     Problem: Safety - Adult  Goal: Free from fall injury  Outcome: Progressing  Flowsheets (Taken 3/18/2023 0327)  Free From Fall Injury: Instruct family/caregiver on patient safety     Problem: ABCDS Injury Assessment  Goal: Absence of physical injury  Outcome: Progressing  Flowsheets (Taken 3/18/2023 0327)  Absence of Physical Injury: Implement safety measures based on patient assessment     Problem: Pain  Goal: Verbalizes/displays adequate comfort level or baseline comfort level  Outcome: Progressing  Flowsheets (Taken 3/18/2023 0327)  Verbalizes/displays adequate comfort level or baseline comfort level:   Encourage patient to monitor pain and request assistance   Assess pain using appropriate pain scale   Administer analgesics based on type and severity of pain and evaluate response   Implement non-pharmacological measures as appropriate and evaluate response

## 2023-03-18 NOTE — PROGRESS NOTES
Patient discharged with written prescriptions for Lantus and Humalog pens as well as needles. Received glucometer, lancets, and test strip prescriptions from retail pharmacy. Received call from patient's spouse, Avi Cary, regarding prescriptions. Per spouse, there are issues filling insulin pens. Cost will be around $700 with good RX. Order for Humalog pens is for 2 pens. [facility] [address] refused to fill pens because order needs to be for 5 pens instead. Patient requesting vials and syringes to reduce the cost. Pharmacy was unsure of availability for Lantus to be filled. Spoke with Maranda Paulson in inpatient pharmacy. Recommended websites for patient to go to for lower co-pay for insulin. Spoke with Dr. Radha Evans via phone regarding these concerns. MD prefers for patient to have insulin pens and not vials/syringes due to new diagnosis. Okay to change prescription for Humalog pens to 5 pens vs 2 pens. Spoke with pharmacist at [facility] on [address]. Called in Humalog prescription over the phone. Per pharmacist, Humalog pen can be filled with 2 pens and not 5. Accepted the prescription for 2 pens. Requested that patient bring in written prescriptions vs calling in over the phone. Aware that patient does not have insurance. Avi Cary and patient updated on above. Instructed to call unit if there are any more issues regarding insulin.      Phone number [phone]    Electronically signed by Judy Horan RN on 3/18/2023 at 6:34 PM

## 2023-03-20 LAB
ALBUMIN SERPL ELPH-MCNC: 3 G/DL (ref 3.1–4.9)
ALPHA1 GLOB SERPL ELPH-MCNC: 0.2 G/DL (ref 0.2–0.4)
ALPHA2 GLOB SERPL ELPH-MCNC: 0.8 G/DL (ref 0.4–1.1)
B-GLOBULIN SERPL ELPH-MCNC: 1.1 G/DL (ref 0.9–1.6)
GAMMA GLOB SERPL ELPH-MCNC: 1.1 G/DL (ref 0.6–1.8)
PROT SERPL-MCNC: 6.2 G/DL (ref 6.4–8.2)
SPE/IFE INTERPRETATION: NORMAL

## 2023-03-24 ENCOUNTER — TELEPHONE (OUTPATIENT)
Dept: PHARMACY | Age: 33
End: 2023-03-24

## 2023-03-24 NOTE — TELEPHONE ENCOUNTER
New Diabetes referral from the hospitalist.  Recommended by the Diabetes Educator. Reached out to schedule initial appointment. Spoke with patient. Scheduled for 3/28/23    Jeffrey Logan, PharmD  [facility]  Diabetes Service  [phone]    For Pharmacy Admin Tracking Only    Program: Medication Management  CPA in place:  Yes  Recommendation Provided To:    Intervention Detail:   Intervention Accepted By:   Alida Griffiths Closed?:    Time Spent (min): 10

## 2023-03-28 ENCOUNTER — OFFICE VISIT (OUTPATIENT)
Dept: PHARMACY | Age: 33
End: 2023-03-28

## 2023-03-28 DIAGNOSIS — E11.9 NEW ONSET TYPE 2 DIABETES MELLITUS (HCC): Primary | ICD-10-CM

## 2023-03-28 PROCEDURE — 99214 OFFICE O/P EST MOD 30 MIN: CPT | Performed by: SPEECH-LANGUAGE PATHOLOGIST

## 2023-03-28 NOTE — PROGRESS NOTES
points  Complex Education - 6 points  Basic Assessment - 2 points      For Pharmacy Admin Tracking Only    Program: Medication Management  CPA in place:  Yes  Recommendation Provided To: Patient/Caregiver: 2 via In person and Pharmacy: 2  Intervention Detail: Adherence Monitorin, Benefit Assistance, and Referral to Other Provider  Intervention Accepted By: Patient/Caregiver: 2 and Pharmacy: 2  Gap Closed?: Yes   Time Spent (min): 60     Angela Tsang PharmD, [facility]  2023  4:07 PM

## 2023-03-28 NOTE — PATIENT INSTRUCTIONS
Visit Summary:  Attend your visit at [address] later this week. Ask them if they can fill Lantus and Humalog for you. Continue to think about starting metformin. Call and schedule an appointment with hematologist Dr. Nakul Pelayo at [phone] about your platelet levels. Continue blood glucose, insulin and meal log. Call with any questions or concerns.

## 2023-12-11 ENCOUNTER — HOSPITAL ENCOUNTER (EMERGENCY)
Age: 33
Discharge: HOME | End: 2023-12-11
Attending: EMERGENCY MEDICINE

## 2023-12-11 ENCOUNTER — APPOINTMENT (OUTPATIENT)
Dept: GENERAL RADIOLOGY | Age: 33
End: 2023-12-11

## 2023-12-11 VITALS
WEIGHT: 214.73 LBS | HEIGHT: 74 IN | HEART RATE: 97 BPM | TEMPERATURE: 100 F | RESPIRATION RATE: 18 BRPM | OXYGEN SATURATION: 98 % | BODY MASS INDEX: 27.56 KG/M2 | SYSTOLIC BLOOD PRESSURE: 124 MMHG | DIASTOLIC BLOOD PRESSURE: 74 MMHG

## 2023-12-11 DIAGNOSIS — J10.1 INFLUENZA B: Primary | ICD-10-CM

## 2023-12-11 LAB
FLUAV RNA UPPER RESP QL NAA+PROBE: NEGATIVE
FLUBV AG NPH QL: POSITIVE
GLUCOSE BLD-MCNC: 154 MG/DL (ref 70–99)
PERFORMED ON: ABNORMAL
SARS-COV-2 RDRP RESP QL NAA+PROBE: NOT DETECTED

## 2023-12-11 PROCEDURE — 87804 INFLUENZA ASSAY W/OPTIC: CPT

## 2023-12-11 PROCEDURE — 71046 X-RAY EXAM CHEST 2 VIEWS: CPT

## 2023-12-11 PROCEDURE — 99284 EMERGENCY DEPT VISIT MOD MDM: CPT

## 2023-12-11 PROCEDURE — 87635 SARS-COV-2 COVID-19 AMP PRB: CPT

## 2023-12-11 RX ORDER — BENZONATATE 200 MG/1
200 CAPSULE ORAL 3 TIMES DAILY PRN
Qty: 30 CAPSULE | Refills: 0 | Status: SHIPPED | OUTPATIENT
Start: 2023-12-11 | End: 2023-12-21

## 2023-12-11 RX ORDER — GUAIFENESIN 600 MG/1
600 TABLET, EXTENDED RELEASE ORAL 2 TIMES DAILY
Qty: 20 TABLET | Refills: 0 | Status: SHIPPED | OUTPATIENT
Start: 2023-12-11 | End: 2023-12-21

## 2023-12-11 NOTE — ED NOTES
D/C: Order noted for d/c. Pt confirmed d/c paperwork have correct name. Discharge and education instructions reviewed with patient. Teach-back successful. Pt verbalized understanding and denied questions at this time. No acute distress noted. Patient instructed to follow-up as noted - return to emergency department if symptoms worsen. Patient verbalized understanding. Discharged per EDMD with discharge instructions. Pt discharged to private vehicle. Patient stable upon departure. Provider aware of patient pain at time of discharge.        Eric Lewis RN  12/11/23 [id number]

## 2023-12-11 NOTE — DISCHARGE INSTRUCTIONS
Continue Tylenol and ibuprofen every 6 hours as needed for fever, body aches. Tessalon as needed for cough. Mucinex as needed for congestion. Follow-up in 4 to 5 days if not improved.   Return as needed for worsening of symptoms or new symptoms of concern

## 2024-11-22 ENCOUNTER — APPOINTMENT (OUTPATIENT)
Dept: CT IMAGING | Age: 34
End: 2024-11-22

## 2024-11-22 ENCOUNTER — HOSPITAL ENCOUNTER (EMERGENCY)
Age: 34
Discharge: HOME | End: 2024-11-22

## 2024-11-22 VITALS
SYSTOLIC BLOOD PRESSURE: 134 MMHG | TEMPERATURE: 97.6 F | HEIGHT: 74 IN | DIASTOLIC BLOOD PRESSURE: 81 MMHG | OXYGEN SATURATION: 96 % | HEART RATE: 97 BPM | WEIGHT: 223.55 LBS | RESPIRATION RATE: 16 BRPM | BODY MASS INDEX: 28.69 KG/M2

## 2024-11-22 DIAGNOSIS — S09.90XA CLOSED HEAD INJURY, INITIAL ENCOUNTER: Primary | ICD-10-CM
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
## 2024-11-22 PROCEDURE — 6370000000 HC RX 637 (ALT 250 FOR IP): Performed by: PHYSICIAN ASSISTANT

## 2024-11-22 PROCEDURE — 70450 CT HEAD/BRAIN W/O DYE: CPT

## 2024-11-22 PROCEDURE — 99284 EMERGENCY DEPT VISIT MOD MDM: CPT

## 2024-11-22 PROCEDURE — 72125 CT NECK SPINE W/O DYE: CPT

## 2024-11-22 RX ORDER — ACETAMINOPHEN 500 MG
1000 TABLET ORAL ONCE
Status: COMPLETED | OUTPATIENT
Start: 2024-11-22 | End: 2024-11-22

## 2024-11-22 RX ORDER — ONDANSETRON 4 MG/1
4 TABLET, ORALLY DISINTEGRATING ORAL ONCE
Status: COMPLETED | OUTPATIENT
Start: 2024-11-22 | End: 2024-11-22

## 2024-11-22 RX ADMIN — ACETAMINOPHEN 1000 MG: 500 TABLET ORAL at 08:36

## 2024-11-22 RX ADMIN — ONDANSETRON 4 MG: 4 TABLET, ORALLY DISINTEGRATING ORAL at 08:37

## 2024-11-22 ASSESSMENT — PAIN SCALES - GENERAL
PAINLEVEL_OUTOF10: 5
PAINLEVEL_OUTOF10: 8

## 2024-11-22 ASSESSMENT — PAIN DESCRIPTION - LOCATION
LOCATION: HEAD
LOCATION: HEAD

## 2024-11-22 ASSESSMENT — PAIN DESCRIPTION - DESCRIPTORS
DESCRIPTORS: ACHING
DESCRIPTORS: ACHING

## 2024-11-22 ASSESSMENT — PAIN - FUNCTIONAL ASSESSMENT: PAIN_FUNCTIONAL_ASSESSMENT: 0-10

## 2024-11-22 NOTE — DISCHARGE INSTRUCTIONS
RED FLAGS signs and symptoms that warrant immediate return to the emergency department include :  Headaches that worsen   Looks very drowsy/ can't be awakened   Can't recognize people or places   Neck pain   Seizures   Repeated vomiting   Increasing confusion or irritability   Unusual behavioral change   Focal neurologic signs   Slurred speech   Weakness or numbness in arms/legs   Change in state of consciousness
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
